# Patient Record
Sex: MALE | Race: WHITE | NOT HISPANIC OR LATINO
[De-identification: names, ages, dates, MRNs, and addresses within clinical notes are randomized per-mention and may not be internally consistent; named-entity substitution may affect disease eponyms.]

---

## 2018-09-27 PROBLEM — Z00.00 ENCOUNTER FOR PREVENTIVE HEALTH EXAMINATION: Status: ACTIVE | Noted: 2018-09-27

## 2018-11-13 ENCOUNTER — RECORD ABSTRACTING (OUTPATIENT)
Age: 48
End: 2018-11-13

## 2018-11-13 DIAGNOSIS — Z78.9 OTHER SPECIFIED HEALTH STATUS: ICD-10-CM

## 2018-11-13 DIAGNOSIS — Z86.39 PERSONAL HISTORY OF OTHER ENDOCRINE, NUTRITIONAL AND METABOLIC DISEASE: ICD-10-CM

## 2018-11-13 RX ORDER — FLASH GLUCOSE SENSOR
KIT MISCELLANEOUS
Refills: 0 | Status: ACTIVE | COMMUNITY

## 2018-11-14 ENCOUNTER — APPOINTMENT (OUTPATIENT)
Dept: ENDOCRINOLOGY | Facility: CLINIC | Age: 48
End: 2018-11-14
Payer: COMMERCIAL

## 2018-11-14 VITALS
WEIGHT: 195 LBS | HEART RATE: 75 BPM | DIASTOLIC BLOOD PRESSURE: 78 MMHG | BODY MASS INDEX: 26.41 KG/M2 | SYSTOLIC BLOOD PRESSURE: 118 MMHG | HEIGHT: 72 IN

## 2018-11-14 PROCEDURE — 99214 OFFICE O/P EST MOD 30 MIN: CPT | Mod: 25

## 2018-11-14 PROCEDURE — 82962 GLUCOSE BLOOD TEST: CPT

## 2018-11-14 PROCEDURE — 95251 CONT GLUC MNTR ANALYSIS I&R: CPT

## 2018-12-20 ENCOUNTER — APPOINTMENT (OUTPATIENT)
Dept: ENDOCRINOLOGY | Facility: CLINIC | Age: 48
End: 2018-12-20
Payer: COMMERCIAL

## 2018-12-20 PROCEDURE — 76536 US EXAM OF HEAD AND NECK: CPT

## 2019-02-26 LAB
GLUCOSE SERPL-MCNC: 147
HBA1C MFR BLD HPLC: 7.2
LDLC SERPL DIRECT ASSAY-MCNC: 118

## 2019-02-27 ENCOUNTER — APPOINTMENT (OUTPATIENT)
Dept: ENDOCRINOLOGY | Facility: CLINIC | Age: 49
End: 2019-02-27
Payer: COMMERCIAL

## 2019-02-27 VITALS
HEIGHT: 72 IN | HEART RATE: 87 BPM | SYSTOLIC BLOOD PRESSURE: 120 MMHG | WEIGHT: 200 LBS | DIASTOLIC BLOOD PRESSURE: 80 MMHG | BODY MASS INDEX: 27.09 KG/M2

## 2019-02-27 LAB — GLUCOSE BLDC GLUCOMTR-MCNC: 112

## 2019-02-27 PROCEDURE — 99215 OFFICE O/P EST HI 40 MIN: CPT | Mod: 25

## 2019-02-27 PROCEDURE — 82962 GLUCOSE BLOOD TEST: CPT

## 2019-02-27 PROCEDURE — 95251 CONT GLUC MNTR ANALYSIS I&R: CPT

## 2019-06-07 ENCOUNTER — MOBILE ON CALL (OUTPATIENT)
Age: 49
End: 2019-06-07

## 2019-06-07 ENCOUNTER — RX RENEWAL (OUTPATIENT)
Age: 49
End: 2019-06-07

## 2019-06-28 ENCOUNTER — RX RENEWAL (OUTPATIENT)
Age: 49
End: 2019-06-28

## 2019-07-03 ENCOUNTER — RX RENEWAL (OUTPATIENT)
Age: 49
End: 2019-07-03

## 2019-10-09 ENCOUNTER — TRANSCRIPTION ENCOUNTER (OUTPATIENT)
Age: 49
End: 2019-10-09

## 2019-10-09 ENCOUNTER — APPOINTMENT (OUTPATIENT)
Dept: ENDOCRINOLOGY | Facility: CLINIC | Age: 49
End: 2019-10-09
Payer: COMMERCIAL

## 2019-10-09 VITALS
HEIGHT: 72 IN | WEIGHT: 200 LBS | DIASTOLIC BLOOD PRESSURE: 80 MMHG | SYSTOLIC BLOOD PRESSURE: 120 MMHG | BODY MASS INDEX: 27.09 KG/M2 | HEART RATE: 91 BPM

## 2019-10-09 LAB
GLUCOSE BLDC GLUCOMTR-MCNC: 146
HBA1C MFR BLD HPLC: 7.2
LDLC SERPL DIRECT ASSAY-MCNC: 126

## 2019-10-09 PROCEDURE — 99214 OFFICE O/P EST MOD 30 MIN: CPT | Mod: 25

## 2019-10-09 PROCEDURE — 82962 GLUCOSE BLOOD TEST: CPT

## 2019-10-09 PROCEDURE — 95251 CONT GLUC MNTR ANALYSIS I&R: CPT

## 2019-10-09 NOTE — REVIEW OF SYSTEMS
[Nocturia] : nocturia [Polyuria] : polyuria [Fatigue] : no fatigue [Recent Weight Gain (___ Lbs)] : no recent weight gain [Recent Weight Loss (___ Lbs)] : no recent weight loss [Blurry Vision] : no blurred vision [Dysphagia] : no dysphagia [Dysphonia] : no dysphonia [Neck Pain] : no neck pain [Chest Pain] : no chest pain [Palpitations] : no palpitations [Shortness Of Breath] : no shortness of breath [SOB on Exertion] : no shortness of breath during exertion [Nausea] : no nausea [Vomiting] : no vomiting was observed [Abdominal Pain] : no abdominal pain [Pain/Numbness of Digits] : no pain/numbness of digits [Depression] : no depression [Anxiety] : no anxiety [Polydipsia] : no polydipsia

## 2019-10-09 NOTE — PHYSICAL EXAM
[Alert] : alert [No Acute Distress] : no acute distress [Well Developed] : well developed [Well Nourished] : well nourished [EOMI] : extra ocular movement intact [PERRL] : pupils equal, round and reactive to light [No Neck Mass] : no neck mass was observed [Thyroid Not Enlarged] : the thyroid was not enlarged [No Thyroid Nodules] : there were no palpable thyroid nodules [No Respiratory Distress] : no respiratory distress [Clear to Auscultation] : lungs were clear to auscultation bilaterally [Normal Rate] : heart rate was normal  [Normal S1, S2] : normal S1 and S2 [Normal Bowel Sounds] : normal bowel sounds [Soft] : abdomen soft [Not Tender] : non-tender [Not Distended] : not distended [No Rash] : no rash [Normal Gait] : normal gait [Normal Reflexes] : deep tendon reflexes were 2+ and symmetric [Cranial Nerves Intact] : cranial nerves 2-12 were intact [No Tremors] : no tremors [Normal Insight/Judgement] : insight and judgment were intact [Oriented x3] : oriented to person, place, and time [Normal Affect] : the affect was normal [Normal Mood] : the mood was normal [Foot Ulcers] : no foot ulcers [Right Foot Was Examined] : right foot ~C was examined [Left Foot Was Examined] : left foot ~C was examined [Normal] : normal [2+] : 2+ in the dorsalis pedis [Vibration Dec.] : normal vibratory sensation at the level of the toes [Position Sense Dec.] : normal position sense at the level of the toes [Diminished Throughout Both Feet] : normal tactile sensation with monofilament testing throughout both feet

## 2019-10-09 NOTE — DATA REVIEWED
[FreeTextEntry1] : Thyroid FNA 2006 Right nodule 1.2 cm - no malignant cells\par \par Thyroid sono 5/18/17 - Right thyriod nodule 1.1x1.0x1.0 cm\par \par Thyroid Ultrasound Report 12/20/18 \par Comparison: Report dated 5/18/17. \par Findings: \par Right thyroid lobe   4.0x1.7x1.7  cm\par Right lower pole isoechoic nodule 1.1x1.2x1.0 cm - not vascular\par heterogeneous gland\par Left Thyroid lobe  4.0x1.4x1.1   cm\par heterogeneous gland\par Isthmus .3  cm\par Impression\par stable Right thyroid nodule that was previously biopsied and reported as benign \par \par \par no recent labs\par \par

## 2019-10-09 NOTE — REASON FOR VISIT
[Follow-Up: _____] : a [unfilled] follow-up visit [FreeTextEntry1] : T1DM, hypothyroid, thyroid nodule, hyperlipidemia

## 2019-10-09 NOTE — ASSESSMENT
[FreeTextEntry1] : T1DM with erratic control. Yuan personal downloaded and reviewed - Interpretation: overnight hyperglycemia, occ post meal lows\par - cont current insulin regimen, pt to start keto diet which may reduce his insulin requirements, advised f/u CDE 1 month. call with highs/lows\par - f/u labs done this am\par - eye exam with ophthalmology\par \par 2. Hypothyroid - euthyroid on exam, cont current LT4 dose, f/u labs done this am\par \par 3. stable Right thyroid nodule that was previously biopsied in 2006 and reported as benign- RTO thyroid sono 12/2019\par \par 4. hyperlipidemia - adhere with statin

## 2019-10-18 ENCOUNTER — RX RENEWAL (OUTPATIENT)
Age: 49
End: 2019-10-18

## 2019-11-21 ENCOUNTER — APPOINTMENT (OUTPATIENT)
Dept: ENDOCRINOLOGY | Facility: CLINIC | Age: 49
End: 2019-11-21

## 2019-11-25 ENCOUNTER — RX RENEWAL (OUTPATIENT)
Age: 49
End: 2019-11-25

## 2019-12-19 ENCOUNTER — APPOINTMENT (OUTPATIENT)
Dept: ENDOCRINOLOGY | Facility: CLINIC | Age: 49
End: 2019-12-19
Payer: COMMERCIAL

## 2019-12-19 PROCEDURE — 76536 US EXAM OF HEAD AND NECK: CPT

## 2020-01-11 ENCOUNTER — RX RENEWAL (OUTPATIENT)
Age: 50
End: 2020-01-11

## 2020-01-11 ENCOUNTER — APPOINTMENT (OUTPATIENT)
Dept: ENDOCRINOLOGY | Facility: CLINIC | Age: 50
End: 2020-01-11

## 2020-01-18 ENCOUNTER — APPOINTMENT (OUTPATIENT)
Dept: ENDOCRINOLOGY | Facility: CLINIC | Age: 50
End: 2020-01-18

## 2020-04-03 ENCOUNTER — RX RENEWAL (OUTPATIENT)
Age: 50
End: 2020-04-03

## 2020-04-19 ENCOUNTER — RX RENEWAL (OUTPATIENT)
Age: 50
End: 2020-04-19

## 2020-04-21 ENCOUNTER — RX RENEWAL (OUTPATIENT)
Age: 50
End: 2020-04-21

## 2020-06-27 ENCOUNTER — APPOINTMENT (OUTPATIENT)
Dept: ENDOCRINOLOGY | Facility: CLINIC | Age: 50
End: 2020-06-27
Payer: COMMERCIAL

## 2020-06-27 PROCEDURE — 99214 OFFICE O/P EST MOD 30 MIN: CPT | Mod: 95

## 2020-06-27 NOTE — HISTORY OF PRESENT ILLNESS
[FreeTextEntry1] : Interval Hx: \par Quality: Type 1\par Severity: poor control, severe\par Duration: 2006\par Onset: blood test \par \par ASSOCIATED SYMPTOMS/COMPLICATIONS: 2019 per pt no DR. edward neuropathy. eye exam, 2020 no \par \par MODIFYING FACTORS:\par Lifetsyle: Diet: eats 3 meals daily, BF high is carb, trying to reduce snacking Exercise: moderately 3-4 days/week- lift weights and walking\par Current DM meds: \par Lantus 45 units in am\par Novolog Carb ratio 1:15, SF: 20, target 100. Avg 8 units w meals\par \par SMBG - Yuan personal. 14 day avg 160. Some lows on weekends with golfing\par ---------------------------------------------------------------------------------------\par Thyroid nodule since 2006, benign Right nodule FNA in 2006. Denies anterior neck pain, dysphagia or voice change\par -------------------------------------------------------------------------------------------------\par Hypothyroid since 2006\par adherent with LT4 100 mcg daily with Vit D\par -----------------------------------------------------------------------------------------------\par HLD - ran out, Rosuvastatin 5 mg every other day\par \par \par \par \par \par

## 2020-06-27 NOTE — PHYSICAL EXAM
[Well Nourished] : well nourished [Healthy Appearance] : healthy appearance [Alert] : alert [No Respiratory Distress] : no respiratory distress [Foot Ulcers] : no foot ulcers [No Accessory Muscle Use] : no accessory muscle use [Normal Affect] : the affect was normal [Normal Insight/Judgement] : insight and judgment were intact [Oriented x3] : oriented to person, place, and time [Normal Mood] : the mood was normal

## 2020-06-27 NOTE — DATA REVIEWED
[FreeTextEntry1] : Thyroid FNA 2006 Right nodule 1.2 cm - no malignant cells\par \par Thyroid sono 5/18/17 - Right thyriod nodule 1.1x1.0x1.0 cm\par \par Thyroid Ultrasound Report 12/20/18 \par Comparison: Report dated 5/18/17. \par Findings: \par Right thyroid lobe   4.0x1.7x1.7  cm\par Right lower pole isoechoic nodule 1.1x1.2x1.0 cm - not vascular\par heterogeneous gland\par Left Thyroid lobe  4.0x1.4x1.1   cm\par heterogeneous gland\par Isthmus .3  cm\par Impression\par stable Right thyroid nodule that was previously biopsied and reported as benign \par \par \par Thyroid Ultrasound Report 12/19/19 \par Comparison: Report dated 12/20/18. \par Findings: \par Right thyroid lobe   4.4x1.5x1.4  cm\par Right lower pole nodule [id number] mm - isoechoic, previously biopsied in 2006 and reported as benign\par Left Thyroid lobe   4.0x1.3x1.5  cm\par Isthmus .3  cm\par Impression\par stable Right thyroid nodule that was previously biopsied and reported as benign\par normal thyroid size\par repeat sono 1 year \par \par \par \par Labs 6/25/20\par Gluc 141, A1c 7.6%\par Cr 0.91, GFR 98\par , HDL 56, Tg 92\par direct LDl 119\par urine alb.Cr 0\par TSH 4.710, T4 1.24\par \par no recent labs\par \par

## 2020-06-27 NOTE — ASSESSMENT
[FreeTextEntry1] : T1DM wo complications - A1c suboptimal\par - invitation to [facility] sent to pt\par - cont current insulin regimen, will need to see Yuan report to make insulin adjustment\par - repeat A1c 3 months\par \par 2. Hypothyroid - euthyroid on exam,TSH mildly elevated likely due to timing of Lt4 with vitamin. suggest taking Lt4 separate from Vit D,  repeat levels 3 months\par \par 3. stable Right thyroid nodule that was previously biopsied in 2006 and reported as benign- RTO thyroid sono 12/2020\par \par 4. hyperlipidemia - LDL chol elevated, restart statin

## 2020-06-27 NOTE — REASON FOR VISIT
[Follow - Up] : a follow-up visit [DM Type 1] : DM Type 1 [Thyroid nodule/ MNG] : thyroid nodule/ MNG [Other___] : [unfilled] [Hypothyroidism] : hypothyroidism [Other Location: e.g. Home (Enter Location, City,State)___] : at [unfilled] [Verbal consent obtained from patient] : the patient, [unfilled] [Home] : at home, [unfilled] , at the time of the visit.

## 2020-06-27 NOTE — REVIEW OF SYSTEMS
[Recent Weight Gain (___ Lbs)] : no recent weight gain [Blurred Vision] : no blurred vision [Recent Weight Loss (___ Lbs)] : no recent weight loss [As Noted in HPI] : as noted in HPI [Shortness Of Breath] : no shortness of breath [Chest Pain] : no chest pain [Abdominal Pain] : no abdominal pain [SOB on Exertion] : no shortness of breath on exertion [Nausea] : no nausea [Nocturia] : no nocturia [Polyuria] : no polyuria [Joint Pain] : no joint pain [Pain/Numbness of Digits] : no pain/numbness of digits [Tremors] : no tremors [Depression] : no depression [Myalgia] : no myalgia  [FreeTextEntry9] : foot cramps at night [Anxiety] : no anxiety [Polydipsia] : no polydipsia

## 2020-10-04 ENCOUNTER — RX RENEWAL (OUTPATIENT)
Age: 50
End: 2020-10-04

## 2020-10-17 ENCOUNTER — APPOINTMENT (OUTPATIENT)
Dept: ENDOCRINOLOGY | Facility: CLINIC | Age: 50
End: 2020-10-17
Payer: COMMERCIAL

## 2020-10-17 PROCEDURE — 99214 OFFICE O/P EST MOD 30 MIN: CPT | Mod: 95

## 2020-10-17 NOTE — DATA REVIEWED
[FreeTextEntry1] : Thyroid FNA 2006 Right nodule 1.2 cm - no malignant cells\par \par Thyroid sono 5/18/17 - Right thyriod nodule 1.1x1.0x1.0 cm\par \par Thyroid Ultrasound Report 12/20/18 \par Comparison: Report dated 5/18/17. \par Findings: \par Right thyroid lobe   4.0x1.7x1.7  cm\par Right lower pole isoechoic nodule 1.1x1.2x1.0 cm - not vascular\par heterogeneous gland\par Left Thyroid lobe  4.0x1.4x1.1   cm\par heterogeneous gland\par Isthmus .3  cm\par Impression\par stable Right thyroid nodule that was previously biopsied and reported as benign \par \par \par Thyroid Ultrasound Report 12/19/19 \par Comparison: Report dated 12/20/18. \par Findings: \par Right thyroid lobe   4.4x1.5x1.4  cm\par Right lower pole nodule [id number] mm - isoechoic, previously biopsied in 2006 and reported as benign\par Left Thyroid lobe   4.0x1.3x1.5  cm\par Isthmus .3  cm\par Impression\par stable Right thyroid nodule that was previously biopsied and reported as benign\par normal thyroid size\par repeat sono 1 year \par \par \par \par Labs 6/25/20\par Gluc 141, A1c 7.6%\par Cr 0.91, GFR 98\par , HDL 56, Tg 92\par direct LDl 119\par urine alb.Cr 0\par TSH 4.710, T4 1.24\par \par \par

## 2020-10-17 NOTE — PHYSICAL EXAM
[Alert] : alert [Well Nourished] : well nourished [No Respiratory Distress] : no respiratory distress [No Accessory Muscle Use] : no accessory muscle use [Healthy Appearance] : healthy appearance [Normal Affect] : the affect was normal [Normal Insight/Judgement] : insight and judgment were intact [Oriented x3] : oriented to person, place, and time [Normal Mood] : the mood was normal [Foot Ulcers] : no foot ulcers

## 2020-10-17 NOTE — ASSESSMENT
[FreeTextEntry1] : 1. T1DM wo complications - poor control on review of lods\par - cont lantus 45 units at bedtime\par - suggest Novolog 1:13, take insulin during meals/right after meals\par - restart Yuan\par -RTO 4 weeks w NP\par - updated labs needed\par \par \par 2. Hypothyroid - euthyroid on exam\par - updated labs needed\par - cont LT4 100 mcg daily\par \par 3. stable Right thyroid nodule that was previously biopsied in 2006 and reported as benign\par - RTO thyroid sono 12/2020\par \par 4. hyperlipidemia\par  - cont statin\par - updated labs needed

## 2020-10-17 NOTE — HISTORY OF PRESENT ILLNESS
[FreeTextEntry1] : Interval Hx: not using Yuan b/c keeps falling\par \par Quality: Type 1\par Severity: poor control, severe\par Duration: 2006\par Onset: blood test \par \par ASSOCIATED SYMPTOMS/COMPLICATIONS: \par 2019 per pt no . \par denies neuropathy.\par  eye exam, 2020 no \par \par MODIFYING FACTORS:\par Lifetsyle: Diet: eats 3 meals daily, BF high is carb, trying to reduce snacking Exercise: moderately 3-4 days/week- lift weights and walking. Weight stable\par Current DM meds: \par Lantus 45 units in am\par Novolog Carb ratio 1:15, SF: 1:20, target 100. Avg 4-5 units w meals, about 45 grams carbs per meals. Taking it after meals\par \par SMBG - not using Yuan, testing FS 4-5x daily.  \par 10/10 - 168  153  223\par 10/11 156 - - 214 - 215\par 10/12 229 - 130 ---------\par 10/13 128 - 189 - 316\par 10/14 291 - 120 - 218 \par 10/15 ----- 142 - 58 \par 10/16 89 - 266 - 247\par 10/17 147\par  \par ---------------------------------------------------------------------------------------\par Thyroid nodule since 2006, benign Right nodule FNA in 2006. \par Denies anterior neck pain, dysphagia or voice change\par -------------------------------------------------------------------------------------------------\par Hypothyroid since 2006\par adherent with LT4 100 mcg daily with Vit D\par -----------------------------------------------------------------------------------------------\par HLD -  adherent Rosuvastatin 5 mg every other day\par \par \par \par \par \par

## 2020-10-17 NOTE — REASON FOR VISIT
[Follow - Up] : a follow-up visit [DM Type 1] : DM Type 1 [Hypothyroidism] : hypothyroidism [Other___] : [unfilled] [Thyroid nodule/ MNG] : thyroid nodule/ MNG [Home] : at home, [unfilled] , at the time of the visit. [Other Location: e.g. Home (Enter Location, City,State)___] : at [unfilled] [Verbal consent obtained from patient] : the patient, [unfilled]

## 2020-10-17 NOTE — REVIEW OF SYSTEMS
[As Noted in HPI] : as noted in HPI [Recent Weight Gain (___ Lbs)] : no recent weight gain [Recent Weight Loss (___ Lbs)] : no recent weight loss [Blurred Vision] : no blurred vision [Chest Pain] : no chest pain [Shortness Of Breath] : no shortness of breath [Nausea] : no nausea [SOB on Exertion] : no shortness of breath on exertion [Abdominal Pain] : no abdominal pain [Polyuria] : no polyuria [Nocturia] : no nocturia [Tremors] : no tremors [Joint Pain] : no joint pain [Myalgia] : no myalgia  [Anxiety] : no anxiety [Depression] : no depression [Pain/Numbness of Digits] : no pain/numbness of digits [Polydipsia] : no polydipsia

## 2020-10-27 ENCOUNTER — NON-APPOINTMENT (OUTPATIENT)
Age: 50
End: 2020-10-27

## 2021-01-04 ENCOUNTER — RX RENEWAL (OUTPATIENT)
Age: 51
End: 2021-01-04

## 2021-03-03 ENCOUNTER — NON-APPOINTMENT (OUTPATIENT)
Age: 51
End: 2021-03-03

## 2021-03-13 ENCOUNTER — APPOINTMENT (OUTPATIENT)
Dept: ENDOCRINOLOGY | Facility: CLINIC | Age: 51
End: 2021-03-13

## 2021-07-06 ENCOUNTER — APPOINTMENT (OUTPATIENT)
Dept: ENDOCRINOLOGY | Facility: CLINIC | Age: 51
End: 2021-07-06
Payer: COMMERCIAL

## 2021-07-06 VITALS
WEIGHT: 200 LBS | OXYGEN SATURATION: 99 % | HEART RATE: 80 BPM | HEIGHT: 72 IN | DIASTOLIC BLOOD PRESSURE: 60 MMHG | SYSTOLIC BLOOD PRESSURE: 132 MMHG | BODY MASS INDEX: 27.09 KG/M2

## 2021-07-06 LAB — GLUCOSE BLDC GLUCOMTR-MCNC: 286

## 2021-07-06 PROCEDURE — 99214 OFFICE O/P EST MOD 30 MIN: CPT | Mod: 25

## 2021-07-06 PROCEDURE — 82962 GLUCOSE BLOOD TEST: CPT

## 2021-07-06 PROCEDURE — 99072 ADDL SUPL MATRL&STAF TM PHE: CPT

## 2021-07-06 PROCEDURE — 95251 CONT GLUC MNTR ANALYSIS I&R: CPT

## 2021-07-06 NOTE — DATA REVIEWED
[FreeTextEntry1] : Thyroid FNA 2006 Right nodule 1.2 cm - no malignant cells\par \par Thyroid sono 5/18/17 - Right thyriod nodule 1.1x1.0x1.0 cm\par \par Thyroid Ultrasound Report 12/20/18 \par Comparison: Report dated 5/18/17. \par Findings: \par Right thyroid lobe   4.0x1.7x1.7  cm\par Right lower pole isoechoic nodule 1.1x1.2x1.0 cm - not vascular\par heterogeneous gland\par Left Thyroid lobe  4.0x1.4x1.1   cm\par heterogeneous gland\par Isthmus .3  cm\par Impression\par stable Right thyroid nodule that was previously biopsied and reported as benign \par \par \par Thyroid Ultrasound Report 12/19/19 \par Comparison: Report dated 12/20/18. \par Findings: \par Right thyroid lobe   4.4x1.5x1.4  cm\par Right lower pole nodule [id number] mm - isoechoic, previously biopsied in 2006 and reported as benign\par Left Thyroid lobe   4.0x1.3x1.5  cm\par Isthmus .3  cm\par Impression\par stable Right thyroid nodule that was previously biopsied and reported as benign\par normal thyroid size\par repeat sono 1 year \par ================================================\par \par \par Labs 6/25/20\par Gluc 141, A1c 7.6%\par Cr 0.91, GFR 98\par , HDL 56, Tg 92\par direct LDl 119\par urine alb.Cr 0\par TSH 4.710, T4 1.24\par \par \par "Labs 10/23/20 reviewed\par 1.  - too high, watch diet, increase rosuvastatin to 5 mg daily\par 2. A1c 8.1 - too high, send logs\par 3. TSH 4.630 elevated - has he missed any Lt4 doses? is he taking it correctly? if not then increase to LT4 112 mcg daily and repeat TFTs 8 weeks\par 4. lab did wrong urine test, should be urine microalb/Cr - this can be done in 8 weeks with thyroid levels"\par \par \par

## 2021-07-06 NOTE — PHYSICAL EXAM
[Alert] : alert [Well Nourished] : well nourished [Healthy Appearance] : healthy appearance [No Accessory Muscle Use] : no accessory muscle use [Oriented x3] : oriented to person, place, and time [Normal Affect] : the affect was normal [Normal Insight/Judgement] : insight and judgment were intact [Normal Mood] : the mood was normal [EOMI] : extra ocular movement intact [No LAD] : no lymphadenopathy [No Thyroid Nodules] : no palpable thyroid nodules [Clear to Auscultation] : lungs were clear to auscultation bilaterally [Not Tender] : non-tender [Soft] : abdomen soft [Foot Ulcers] : no foot ulcers [2+] : 2+ in the dorsalis pedis [Vibration Dec.] : normal vibratory sensation at the level of the toes [Position Sense Dec.] : normal position sense at the level of the toes [Diminished Throughout Both Feet] : normal tactile sensation with monofilament testing throughout both feet

## 2021-07-06 NOTE — HISTORY OF PRESENT ILLNESS
[FreeTextEntry1] : Interval Hx:  no issues, no changes. did not have labs done due to death in family\par \par Quality: Type 1\par Severity: poor control, severe\par Duration: 2006\par Onset: blood test \par \par ASSOCIATED SYMPTOMS/COMPLICATIONS: \par no recent eye exam \par denies neuropathy.\par \par MODIFYING FACTORS:\par Lifetsyle: Diet: eats 3 meals daily, BF high is carb, trying to reduce snacking Exercise: moderately 3-4 days/week- lift weights and walking. \par Current DM meds: \par Lantus 45 units in am\par Novolog Carb ratio 1:15, SF: 1:20, target 100. On avg taking 12 units\par \par SMBG - Yuan\par CGM downloaded and reviewed: Yuan\par Average glucose: 175\par % time CGM active: 78\par Glucose variability (target <36%): 38\par \par % VERY HIGH (>250): 13\par % HIGH (181-250): 36\par % TARGET ():46\par % LOW (54-69): 2\par % VERY LOW (<54): 3\par \par Interpretation: poor control, overnight lows, daytime hyperglycemia\par  ---------------------------------------------------------------------------------------\par Thyroid nodule since 2006, benign Right nodule FNA in 2006. \par Denies anterior neck pain, dysphagia or voice change\par -------------------------------------------------------------------------------------------------\par Hypothyroid since 2006\par adherent with LT4 112 mcg daily on empty stomach\par -----------------------------------------------------------------------------------------------\par HLD -  non-adherent Rosuvastatin 5 mg every other day\par \par \par \par \par \par

## 2021-07-06 NOTE — REVIEW OF SYSTEMS
[As Noted in HPI] : as noted in HPI [Recent Weight Gain (___ Lbs)] : no recent weight gain [Recent Weight Loss (___ Lbs)] : no recent weight loss [Blurred Vision] : no blurred vision [Chest Pain] : no chest pain [Shortness Of Breath] : no shortness of breath [SOB on Exertion] : no shortness of breath on exertion [Nausea] : no nausea [Abdominal Pain] : no abdominal pain [Polyuria] : no polyuria [Nocturia] : no nocturia [Joint Pain] : no joint pain [Myalgia] : no myalgia  [Tremors] : no tremors [Pain/Numbness of Digits] : no pain/numbness of digits [Depression] : no depression [Anxiety] : no anxiety [Polydipsia] : no polydipsia

## 2021-07-06 NOTE — ASSESSMENT
[FreeTextEntry1] : 1. T1DM wo complications - yuan reviewed - overnight lows, daytime hyperglycemia\par - decrease lantus 40 units, Novolog 1:12 w meals, cont ISF 1:20 target 100\par - cont Yuan\par -RTO 4 weeks w NP for another DL\par - updated labs needed this montha nd in 3 months\par - need eye exam w ophthalmology\par \par 2. Hypothyroid - euthyroid on exam\par - updated labs needed\par - cont LT4 112 mcg daily\par \par 3. stable Right thyroid nodule that was previously biopsied in 2006 and reported as benign, no recent sono\par - RTO thyroid sono \par \par 4. hyperlipidemia - nonadherent with statin\par  - adhere with  statin\par - updated labs needed

## 2021-09-22 LAB
HBA1C MFR BLD HPLC: 8.3
LDLC SERPL DIRECT ASSAY-MCNC: 125

## 2021-09-23 ENCOUNTER — APPOINTMENT (OUTPATIENT)
Dept: ENDOCRINOLOGY | Facility: CLINIC | Age: 51
End: 2021-09-23

## 2021-10-07 ENCOUNTER — APPOINTMENT (OUTPATIENT)
Dept: ENDOCRINOLOGY | Facility: CLINIC | Age: 51
End: 2021-10-07
Payer: COMMERCIAL

## 2021-10-07 PROCEDURE — 76536 US EXAM OF HEAD AND NECK: CPT

## 2021-10-19 ENCOUNTER — TRANSCRIPTION ENCOUNTER (OUTPATIENT)
Age: 51
End: 2021-10-19

## 2021-10-21 ENCOUNTER — RESULT CHARGE (OUTPATIENT)
Age: 51
End: 2021-10-21

## 2021-10-21 ENCOUNTER — APPOINTMENT (OUTPATIENT)
Dept: ENDOCRINOLOGY | Facility: CLINIC | Age: 51
End: 2021-10-21
Payer: COMMERCIAL

## 2021-10-21 VITALS
SYSTOLIC BLOOD PRESSURE: 114 MMHG | BODY MASS INDEX: 26.95 KG/M2 | OXYGEN SATURATION: 97 % | DIASTOLIC BLOOD PRESSURE: 74 MMHG | TEMPERATURE: 98.6 F | HEART RATE: 92 BPM | WEIGHT: 199 LBS | HEIGHT: 72 IN

## 2021-10-21 LAB — GLUCOSE BLDC GLUCOMTR-MCNC: 188

## 2021-10-21 PROCEDURE — 99215 OFFICE O/P EST HI 40 MIN: CPT | Mod: 25

## 2021-10-21 PROCEDURE — 95251 CONT GLUC MNTR ANALYSIS I&R: CPT

## 2021-10-21 PROCEDURE — 82962 GLUCOSE BLOOD TEST: CPT

## 2021-10-21 NOTE — ASSESSMENT
[FreeTextEntry1] : 1. T1DM wo complications, A1c worse, Yuan reviewed - erratic control with highs/lows during daytime, overnight hyperglycemia. due to behavior and meals\par - cont Yuan\par - take insulin before meals, not after\par - cont Lantus 40 units daily\par - change Novolog: ICR 1:10, ISF 1:30, target 100\par - stop late night snacking which is causing overnight highs, can have snak if bedtime FS <100\par - reviewed areas for injection avoid areas of lipohypertrophy which can cause variable insulin absorption\par - suggested using apps to help estimate carbs better\par \par 2. Hypothyroid - TSH elevated, increase LT4 125 mcg dailyu, repeat TFTs 6 weeks\par \par 3. stable Right thyroid nodule that was previously biopsied in 2006 and reported as benign, \par - repeat sonogram 2022 to monitor size and appearance of nodule\par \par 4. hyperlipidemia - LDL chol elevated, adhere with statin

## 2021-10-21 NOTE — PHYSICAL EXAM
[Alert] : alert [Well Nourished] : well nourished [Healthy Appearance] : healthy appearance [EOMI] : extra ocular movement intact [No LAD] : no lymphadenopathy [No Thyroid Nodules] : no palpable thyroid nodules [No Accessory Muscle Use] : no accessory muscle use [Clear to Auscultation] : lungs were clear to auscultation bilaterally [Not Tender] : non-tender [Soft] : abdomen soft [Oriented x3] : oriented to person, place, and time [Normal Affect] : the affect was normal [Normal Insight/Judgement] : insight and judgment were intact [Normal Mood] : the mood was normal [Foot Ulcers] : no foot ulcers [de-identified] : (+) lipohypertrophy on abd wall

## 2021-10-21 NOTE — HISTORY OF PRESENT ILLNESS
[FreeTextEntry1] : Interval Hx:  \par finding it hard to inject insulin, noticing sites are hard\par \par Quality: Type 1\par Severity: poor control, severe\par Duration: \par Onset: blood test \par \par ASSOCIATED SYMPTOMS/COMPLICATIONS: \par 10/12/21 eye exam no DR\par denies neuropathy.\par 10/2021 neg alb/Cr, GFR 89\par \par MODIFYING FACTORS:\par Lifetsyle: Diet: eats 3 meals daily, BF high is carb, trying to reduce snacking Exercise: moderately 3-4 days/week- lift weights and walking. \par Current DM meds: \par Lantus 40 units in am\par Novolo:12 w meals, cont ISF 1:20; target 100\par \par SMBG - Yuan\par CGM downloaded and reviewed: Yuan\par Average glucose: 199\par % time CGM active: 72\par Glucose variability (target <36%): 32\par \par % VERY HIGH (>250): 24\par % HIGH (181-250): 35\par % TARGET ():29\par % LOW (54-69): 2\par % VERY LOW (<54): 0\par \par Interpretation: improved lows, now worsening control, erratic numbers\par  ---------------------------------------------------------------------------------------\par Thyroid nodule since , benign Right nodule FNA in . \par Denies anterior neck pain, dysphagia or voice change\par -------------------------------------------------------------------------------------------------\par Hypothyroid since \par adherent with LT4 112 mcg daily on empty stomach\par -----------------------------------------------------------------------------------------------\par HLD -  non-adherent Rosuvastatin 5 mg daily\par \par \par \par \par \par

## 2021-10-21 NOTE — DATA REVIEWED
[FreeTextEntry1] : Thyroid FNA 2006 Right nodule 1.2 cm - no malignant cells\par \par Thyroid sono 5/18/17 - Right thyriod nodule 1.1x1.0x1.0 cm\par \par Thyroid Ultrasound Report 12/20/18 \par Comparison: Report dated 5/18/17. \par Findings: \par Right thyroid lobe   4.0x1.7x1.7  cm\par Right lower pole isoechoic nodule 1.1x1.2x1.0 cm - not vascular\par heterogeneous gland\par Left Thyroid lobe  4.0x1.4x1.1   cm\par heterogeneous gland\par Isthmus .3  cm\par Impression\par stable Right thyroid nodule that was previously biopsied and reported as benign \par \par \par Thyroid Ultrasound Report 12/19/19 \par Comparison: Report dated 12/20/18. \par Findings: \par Right thyroid lobe   4.4x1.5x1.4  cm\par Right lower pole nodule [id number] mm - isoechoic, previously biopsied in 2006 and reported as benign\par Left Thyroid lobe   4.0x1.3x1.5  cm\par Isthmus .3  cm\par Impression\par stable Right thyroid nodule that was previously biopsied and reported as benign\par normal thyroid size\par repeat sono 1 year \par \par Thyroid Ultrasound Report 10/7/21 \par Comparison: Report dated 12/19/19. \par Findings: \par Right thyroid lobe   4.2x1.7x1.5  cm - heterogeneous gland\par Right lower pole nodule 1.1x1.1x1.0 cm - stable, hypoechoic, not vascular, no microcalcification\par Left Thyroid lobe   3.7x1.3x1.2  cm -heterogeneous gland\par Isthmus 0.3  cm\par Impression\par normal thyroid size\par stable Right lower pole thyroid nodule\par repeat sonogram in 1 year \par ================================================\par \par \par Labs 6/25/20\par Gluc 141, A1c 7.6%\par Cr 0.91, GFR 98\par , HDL 56, Tg 92\par direct LDl 119\par urine alb.Cr 0\par TSH 4.710, T4 1.24\par \par \par "Labs 10/23/20 reviewed\par 1.  - too high, watch diet, increase rosuvastatin to 5 mg daily\par 2. A1c 8.1 - too high, send logs\par 3. TSH 4.630 elevated - has he missed any Lt4 doses? is he taking it correctly? if not then increase to LT4 112 mcg daily and repeat TFTs 8 weeks\par 4. lab did wrong urine test, should be urine microalb/Cr - this can be done in 8 weeks with thyroid levels"\par \par Labs 9/20/21\par CBC wnl\par Gluc 120, A1c 8.3\par Cr 0.98, GFR 89\par Tg 100, HDL 64, \par urine alb/Cr 4\par TSH 5.840, Ft4 1.13\par \par \par

## 2021-12-10 ENCOUNTER — APPOINTMENT (OUTPATIENT)
Dept: ENDOCRINOLOGY | Facility: CLINIC | Age: 51
End: 2021-12-10
Payer: COMMERCIAL

## 2021-12-10 ENCOUNTER — RESULT CHARGE (OUTPATIENT)
Age: 51
End: 2021-12-10

## 2021-12-10 VITALS
HEART RATE: 66 BPM | HEIGHT: 72 IN | BODY MASS INDEX: 26.41 KG/M2 | DIASTOLIC BLOOD PRESSURE: 70 MMHG | SYSTOLIC BLOOD PRESSURE: 120 MMHG | WEIGHT: 195 LBS

## 2021-12-10 LAB — GLUCOSE BLDC GLUCOMTR-MCNC: 101

## 2021-12-10 PROCEDURE — 99214 OFFICE O/P EST MOD 30 MIN: CPT | Mod: 25

## 2021-12-10 PROCEDURE — 82962 GLUCOSE BLOOD TEST: CPT

## 2021-12-10 NOTE — PHYSICAL EXAM
[Alert] : alert [Well Nourished] : well nourished [Healthy Appearance] : healthy appearance [EOMI] : extra ocular movement intact [No LAD] : no lymphadenopathy [No Thyroid Nodules] : no palpable thyroid nodules [No Accessory Muscle Use] : no accessory muscle use [Clear to Auscultation] : lungs were clear to auscultation bilaterally [Not Tender] : non-tender [Soft] : abdomen soft [Oriented x3] : oriented to person, place, and time [Normal Affect] : the affect was normal [Normal Insight/Judgement] : insight and judgment were intact [Normal Mood] : the mood was normal [Foot Ulcers] : no foot ulcers

## 2021-12-10 NOTE — HISTORY OF PRESENT ILLNESS
[FreeTextEntry1] : Interval Hx:  has not been using Yuan CGM, started using it again this week but unclear if he has been scanning, had been using glucometer\par finding it hard to inject insulin, noticing sites are hard\par \par Quality: Type 1\par Severity: poor control, severe\par Duration: 2006\par Onset: blood test \par \par ASSOCIATED SYMPTOMS/COMPLICATIONS: \par 10/12/21 eye exam no DR\par denies neuropathy.\par 10/2021 neg alb/Cr, GFR 89\par \par MODIFYING FACTORS:\par Lifetsyle: Diet: eats 3 meals daily, BF high is carb, trying to reduce snacking at bedtime. Exercise: moderately 3-4 days/week- lift weights and walking. \par Current DM meds: \par Lantus 40 units in am\par Novolog: ICR 1:10, ISF 1:30, target 100\par \par SMBG - Yuan\par CGM downloaded and reviewed: Yuan\par not enough data, <72 hours\par reports waking up FS 's, reports improved control\par \par \par  ---------------------------------------------------------------------------------------\par Thyroid nodule since 2006, benign Right nodule FNA in 2006. \par Denies anterior neck pain, dysphagia or voice change\par -------------------------------------------------------------------------------------------------\par Hypothyroid since 2006\par adherent with LT4 125 mcg daily on empty stomach\par -----------------------------------------------------------------------------------------------\par HLD -  adherent Rosuvastatin 5 mg daily\par \par \par \par \par \par

## 2021-12-10 NOTE — ASSESSMENT
[FreeTextEntry1] : 1. T1DM wo complications - not enough data on Yuan. Pt reports improved control.\par - cont Yuan CGM, needs to scan more or use glucometer when Yuan sensor not in place\par - take insulin before meals, not after\par - cont Lantus 40 units daily\par - cont Novolog: ICR 1:10, ISF 1:30, target 100\par - cont dietary changes\par - reviewed areas for injection avoid areas of lipohypertrophy which can cause variable insulin absorption\par - suggested using apps to help estimate carbs better\par - repeat A1c\par \par 2. Hypothyroid - TSH improved,  cont  LT4 125 mcg dailyu, repeat TFTs 8 weeks\par \par 3. stable Right thyroid nodule that was previously biopsied in 2006 and reported as benign, \par - repeat sonogram 2022 to monitor size and appearance of nodule\par \par 4. hyperlipidemia - LDL chol elevated, adhere with statin, monitor levels

## 2021-12-10 NOTE — DATA REVIEWED
[FreeTextEntry1] : Thyroid FNA 2006 Right nodule 1.2 cm - no malignant cells\par \par Thyroid sono 5/18/17 - Right thyriod nodule 1.1x1.0x1.0 cm\par \par Thyroid Ultrasound Report 12/20/18 \par Comparison: Report dated 5/18/17. \par Findings: \par Right thyroid lobe   4.0x1.7x1.7  cm\par Right lower pole isoechoic nodule 1.1x1.2x1.0 cm - not vascular\par heterogeneous gland\par Left Thyroid lobe  4.0x1.4x1.1   cm\par heterogeneous gland\par Isthmus .3  cm\par Impression\par stable Right thyroid nodule that was previously biopsied and reported as benign \par \par \par Thyroid Ultrasound Report 12/19/19 \par Comparison: Report dated 12/20/18. \par Findings: \par Right thyroid lobe   4.4x1.5x1.4  cm\par Right lower pole nodule [id number] mm - isoechoic, previously biopsied in 2006 and reported as benign\par Left Thyroid lobe   4.0x1.3x1.5  cm\par Isthmus .3  cm\par Impression\par stable Right thyroid nodule that was previously biopsied and reported as benign\par normal thyroid size\par repeat sono 1 year \par \par Thyroid Ultrasound Report 10/7/21 \par Comparison: Report dated 12/19/19. \par Findings: \par Right thyroid lobe   4.2x1.7x1.5  cm - heterogeneous gland\par Right lower pole nodule 1.1x1.1x1.0 cm - stable, hypoechoic, not vascular, no microcalcification\par Left Thyroid lobe   3.7x1.3x1.2  cm -heterogeneous gland\par Isthmus 0.3  cm\par Impression\par normal thyroid size\par stable Right lower pole thyroid nodule\par repeat sonogram in 1 year \par ================================================\par \par \par Labs 6/25/20\par Gluc 141, A1c 7.6%\par Cr 0.91, GFR 98\par , HDL 56, Tg 92\par direct LDl 119\par urine alb.Cr 0\par TSH 4.710, T4 1.24\par \par \par "Labs 10/23/20 reviewed\par 1.  - too high, watch diet, increase rosuvastatin to 5 mg daily\par 2. A1c 8.1 - too high, send logs\par 3. TSH 4.630 elevated - has he missed any Lt4 doses? is he taking it correctly? if not then increase to LT4 112 mcg daily and repeat TFTs 8 weeks\par 4. lab did wrong urine test, should be urine microalb/Cr - this can be done in 8 weeks with thyroid levels"\par \par Labs 9/20/21\par CBC wnl\par Gluc 120, A1c 8.3\par Cr 0.98, GFR 89\par Tg 100, HDL 64, \par urine alb/Cr 4\par TSH 5.840, Ft4 1.13\par \par Labs 12/6/21 TSH 3.850, Ft4 1.27\par \par  upon my review of CXR, there is no appreciable consolidation- would favor observing off abx; check procal level  Dr. Moore (pulm) consulted  h/o DANIEL- will likely need CPAP QHS upon my review of CXR, there is no appreciable consolidation- would favor observing off abx; check procal level  Dr. Moore (pulm) consulted  h/o DANIEL- will likely need CPAP QHS  Received a CTA chest ordered by Pulm that did not show PNA, PE or pleural effusion.

## 2022-01-12 ENCOUNTER — RX RENEWAL (OUTPATIENT)
Age: 52
End: 2022-01-12

## 2022-03-18 ENCOUNTER — RESULT CHARGE (OUTPATIENT)
Age: 52
End: 2022-03-18

## 2022-03-18 ENCOUNTER — APPOINTMENT (OUTPATIENT)
Dept: ENDOCRINOLOGY | Facility: CLINIC | Age: 52
End: 2022-03-18
Payer: COMMERCIAL

## 2022-03-18 VITALS
WEIGHT: 200 LBS | HEIGHT: 72 IN | SYSTOLIC BLOOD PRESSURE: 110 MMHG | HEART RATE: 89 BPM | BODY MASS INDEX: 27.09 KG/M2 | DIASTOLIC BLOOD PRESSURE: 70 MMHG

## 2022-03-18 LAB
GLUCOSE BLDC GLUCOMTR-MCNC: 164
HBA1C MFR BLD HPLC: 7.8
LDLC SERPL DIRECT ASSAY-MCNC: 118
MICROALBUMIN/CREAT 24H UR-RTO: <2

## 2022-03-18 PROCEDURE — 82962 GLUCOSE BLOOD TEST: CPT

## 2022-03-18 PROCEDURE — 99214 OFFICE O/P EST MOD 30 MIN: CPT | Mod: 25

## 2022-03-18 PROCEDURE — 95251 CONT GLUC MNTR ANALYSIS I&R: CPT

## 2022-03-18 NOTE — PHYSICAL EXAM
[Alert] : alert [EOMI] : extra ocular movement intact [No Accessory Muscle Use] : no accessory muscle use [Clear to Auscultation] : lungs were clear to auscultation bilaterally [Normal S1, S2] : normal S1 and S2 [Normal Rate] : heart rate was normal [No Edema] : no peripheral edema [Not Tender] : non-tender [Soft] : abdomen soft [Oriented x3] : oriented to person, place, and time [Normal Affect] : the affect was normal [Normal Insight/Judgement] : insight and judgment were intact [Normal Mood] : the mood was normal [de-identified] : (+) lipohypertrophy

## 2022-03-18 NOTE — DATA REVIEWED
[FreeTextEntry1] : Thyroid FNA 2006 Right nodule 1.2 cm - no malignant cells\par \par Thyroid sono 5/18/17 - Right thyriod nodule 1.1x1.0x1.0 cm\par \par Thyroid Ultrasound Report 12/20/18 \par Comparison: Report dated 5/18/17. \par Findings: \par Right thyroid lobe   4.0x1.7x1.7  cm\par Right lower pole isoechoic nodule 1.1x1.2x1.0 cm - not vascular\par heterogeneous gland\par Left Thyroid lobe  4.0x1.4x1.1   cm\par heterogeneous gland\par Isthmus .3  cm\par Impression\par stable Right thyroid nodule that was previously biopsied and reported as benign \par \par \par Thyroid Ultrasound Report 12/19/19 \par Comparison: Report dated 12/20/18. \par Findings: \par Right thyroid lobe   4.4x1.5x1.4  cm\par Right lower pole nodule [id number] mm - isoechoic, previously biopsied in 2006 and reported as benign\par Left Thyroid lobe   4.0x1.3x1.5  cm\par Isthmus .3  cm\par Impression\par stable Right thyroid nodule that was previously biopsied and reported as benign\par normal thyroid size\par repeat sono 1 year \par \par Thyroid Ultrasound Report 10/7/21 \par Comparison: Report dated 12/19/19. \par Findings: \par Right thyroid lobe   4.2x1.7x1.5  cm - heterogeneous gland\par Right lower pole nodule 1.1x1.1x1.0 cm - stable, hypoechoic, not vascular, no microcalcification\par Left Thyroid lobe   3.7x1.3x1.2  cm -heterogeneous gland\par Isthmus 0.3  cm\par Impression\par normal thyroid size\par stable Right lower pole thyroid nodule\par repeat sonogram in 1 year \par ================================================\par \par \par Labs 6/25/20\par Gluc 141, A1c 7.6%\par Cr 0.91, GFR 98\par , HDL 56, Tg 92\par direct LDl 119\par urine alb.Cr 0\par TSH 4.710, T4 1.24\par \par \par "Labs 10/23/20 reviewed\par 1.  - too high, watch diet, increase rosuvastatin to 5 mg daily\par 2. A1c 8.1 - too high, send logs\par 3. TSH 4.630 elevated - has he missed any Lt4 doses? is he taking it correctly? if not then increase to LT4 112 mcg daily and repeat TFTs 8 weeks\par 4. lab did wrong urine test, should be urine microalb/Cr - this can be done in 8 weeks with thyroid levels"\par \par Labs 9/20/21\par CBC wnl\par Gluc 120, A1c 8.3\par Cr 0.98, GFR 89\par Tg 100, HDL 64, \par urine alb/Cr 4\par TSH 5.840, Ft4 1.13\par \par Labs 12/6/21 TSH 3.850, Ft4 1.27\par \par Labs 3/10/22\par CBC wnl\par Gluc 196, A1c 7.8\par Cr 1.08, GFR 83\par , HDL 60, Tg 88\par urine alb/Cr neg\par TSH 4.820, Ft4 1.20\par

## 2022-03-18 NOTE — ASSESSMENT
[FreeTextEntry1] : 1. T1DM wo complications - A1c 7.6, improved, recent hyperglycemia due to stress. Yuan reviewed - has better control prior to wifes surgery with increased diligence to diet and and insulin dosing\par - cont Yuan CGM\par - try prandial Lyumjev, sample given\par - cont Lantus 40 units daily\par - cont Novolog: ICR 1:10, ISF 1:30, target 100\par - cont dietary changes\par - rotate insulin injections sides\par - repeat A1c 3 months\par \par 2. Hypothyroid - TSH worse,  increase LT4 137 mcg daily, repeat TFTs 3 months\par \par 3. stable Right thyroid nodule that was previously biopsied in 2006 and reported as benign, \par - repeat sonogram 10/2022 to monitor size and appearance of nodule\par \par 4. hyperlipidemia - LDL chol elevated, adhere with statin, monitor levels

## 2022-03-18 NOTE — REVIEW OF SYSTEMS
[Blurred Vision] : no blurred vision [Chest Pain] : no chest pain [Shortness Of Breath] : no shortness of breath [Nausea] : no nausea [Abdominal Pain] : no abdominal pain [Polyuria] : no polyuria [Nocturia] : no nocturia [Pain/Numbness of Digits] : no pain/numbness of digits [Polydipsia] : no polydipsia [FreeTextEntry4] : sinus infection

## 2022-06-03 ENCOUNTER — APPOINTMENT (OUTPATIENT)
Dept: ENDOCRINOLOGY | Facility: CLINIC | Age: 52
End: 2022-06-03

## 2022-09-13 ENCOUNTER — RX RENEWAL (OUTPATIENT)
Age: 52
End: 2022-09-13

## 2022-09-14 ENCOUNTER — NON-APPOINTMENT (OUTPATIENT)
Age: 52
End: 2022-09-14

## 2022-10-11 LAB
HBA1C MFR BLD HPLC: 7.7
LDLC SERPL DIRECT ASSAY-MCNC: 124

## 2022-10-12 ENCOUNTER — RESULT CHARGE (OUTPATIENT)
Age: 52
End: 2022-10-12

## 2022-10-12 ENCOUNTER — APPOINTMENT (OUTPATIENT)
Dept: ENDOCRINOLOGY | Facility: CLINIC | Age: 52
End: 2022-10-12

## 2022-10-12 VITALS
HEIGHT: 72 IN | DIASTOLIC BLOOD PRESSURE: 68 MMHG | WEIGHT: 207 LBS | OXYGEN SATURATION: 97 % | BODY MASS INDEX: 28.04 KG/M2 | SYSTOLIC BLOOD PRESSURE: 126 MMHG | HEART RATE: 78 BPM

## 2022-10-12 LAB — GLUCOSE BLDC GLUCOMTR-MCNC: 101

## 2022-10-12 PROCEDURE — 99214 OFFICE O/P EST MOD 30 MIN: CPT | Mod: 25

## 2022-10-12 PROCEDURE — 95251 CONT GLUC MNTR ANALYSIS I&R: CPT

## 2022-10-12 PROCEDURE — 82962 GLUCOSE BLOOD TEST: CPT

## 2022-11-03 ENCOUNTER — APPOINTMENT (OUTPATIENT)
Dept: ENDOCRINOLOGY | Facility: CLINIC | Age: 52
End: 2022-11-03

## 2022-11-03 PROCEDURE — 76536 US EXAM OF HEAD AND NECK: CPT

## 2022-11-07 NOTE — ASSESSMENT
[FreeTextEntry1] : 1. T1DM wo complications - A1c 7.7, suboptimcal. Yuan reviewed - erratice control with hyper and hypoglycemia ?due to gastroparesis vs insulin timing\par - cont Yuan CGM, change to Yuan 3 if insurance allows, he does not want dexcom\par - decrease Lantus 38 units daily\par - change Novolog ICR 1:11, SF 1:40 w target 110, needs to bolus 15 min before meals\par - GI eval advised\par - cont dietary changes\par - rotate insulin injections sides\par - repeat A1c next month\par \par 2. Hypothyroid - on LT4 150 mcg for past 4 weeks, repeat labs next month\par \par 3. stable Right thyroid nodule that was previously biopsied in 2006 and reported as benign, \par - repeat sonogram in office to monitor size and appearance of nodule\par \par 4. hyperlipidemia - cont statin\par \par Glucose Sensor Necessity:  This patient with diabetes (dx: E10.65)  This patient is currently using a Yuan continuous glucose monitor.  The patient is treated with insulin via 3 or more injections daily.  This patient requires frequent adjustments to their insulin treatment on the basis of therapeutic continuous glucose monitoring results. (or This patient is adjusting their blood glucose based on data from the continuous glucose monitor.) In addition, the patient has been to our office for an evaluation of their diabetes control within the past 6 months.\par \par Patient is adhering to CGM regimen and diabetes treatment plan.\par

## 2022-11-07 NOTE — PHYSICAL EXAM
[Alert] : alert [EOMI] : extra ocular movement intact [No Accessory Muscle Use] : no accessory muscle use [Clear to Auscultation] : lungs were clear to auscultation bilaterally [Normal S1, S2] : normal S1 and S2 [Normal Rate] : heart rate was normal [No Edema] : no peripheral edema [Not Tender] : non-tender [Soft] : abdomen soft [Oriented x3] : oriented to person, place, and time [Normal Affect] : the affect was normal [Normal Insight/Judgement] : insight and judgment were intact [Normal Mood] : the mood was normal [de-identified] : (+) lipohypertrophy

## 2022-11-07 NOTE — DATA REVIEWED
[FreeTextEntry1] : Thyroid FNA 2006 Right nodule 1.2 cm - no malignant cells\par \par Thyroid sono 5/18/17 - Right thyriod nodule 1.1x1.0x1.0 cm\par \par Thyroid Ultrasound Report 12/20/18 \par Comparison: Report dated 5/18/17. \par Findings: \par Right thyroid lobe   4.0x1.7x1.7  cm\par Right lower pole isoechoic nodule 1.1x1.2x1.0 cm - not vascular\par heterogeneous gland\par Left Thyroid lobe  4.0x1.4x1.1   cm\par heterogeneous gland\par Isthmus .3  cm\par Impression\par stable Right thyroid nodule that was previously biopsied and reported as benign \par \par \par Thyroid Ultrasound Report 12/19/19 \par Comparison: Report dated 12/20/18. \par Findings: \par Right thyroid lobe   4.4x1.5x1.4  cm\par Right lower pole nodule [id number] mm - isoechoic, previously biopsied in 2006 and reported as benign\par Left Thyroid lobe   4.0x1.3x1.5  cm\par Isthmus .3  cm\par Impression\par stable Right thyroid nodule that was previously biopsied and reported as benign\par normal thyroid size\par repeat sono 1 year \par \par Thyroid Ultrasound Report 10/7/21 \par Comparison: Report dated 12/19/19. \par Findings: \par Right thyroid lobe   4.2x1.7x1.5  cm - heterogeneous gland\par Right lower pole nodule 1.1x1.1x1.0 cm - stable, hypoechoic, not vascular, no microcalcification\par Left Thyroid lobe   3.7x1.3x1.2  cm -heterogeneous gland\par Isthmus 0.3  cm\par Impression\par normal thyroid size\par stable Right lower pole thyroid nodule\par repeat sonogram in 1 year \par ================================================\par \par \par Labs 6/25/20\par Gluc 141, A1c 7.6%\par Cr 0.91, GFR 98\par , HDL 56, Tg 92\par direct LDl 119\par urine alb.Cr 0\par TSH 4.710, T4 1.24\par \par \par "Labs 10/23/20 reviewed\par 1.  - too high, watch diet, increase rosuvastatin to 5 mg daily\par 2. A1c 8.1 - too high, send logs\par 3. TSH 4.630 elevated - has he missed any Lt4 doses? is he taking it correctly? if not then increase to LT4 112 mcg daily and repeat TFTs 8 weeks\par 4. lab did wrong urine test, should be urine microalb/Cr - this can be done in 8 weeks with thyroid levels"\par \par Labs 9/20/21\par CBC wnl\par Gluc 120, A1c 8.3\par Cr 0.98, GFR 89\par Tg 100, HDL 64, \par urine alb/Cr 4\par TSH 5.840, Ft4 1.13\par \par Labs 12/6/21 TSH 3.850, Ft4 1.27\par \par Labs 3/10/22\par CBC wnl\par Gluc 196, A1c 7.8\par Cr 1.08, GFR 83\par , HDL 60, Tg 88\par urine alb/Cr neg\par TSH 4.820, Ft4 1.20\par \par Labs 8/18/22\par Gluc 117, A1c 7.7\par Cr 0.94, GFR 98\par , HDL 58, Tg 109\par TSH 4.630, FT4 1.26\par

## 2022-11-07 NOTE — HISTORY OF PRESENT ILLNESS
[FreeTextEntry1] : Interval Hx:  no issues, feels better on higher LT4 dose, tried Lyumjev but felt this did not work well\par \par Quality: Type 1\par Severity: poor control, severe\par Duration: 2006\par Onset: blood test \par \par ASSOCIATED SYMPTOMS/COMPLICATIONS: \par 10/12/21 eye exam no DR\par denies neuropathy.\par 10/2021 neg alb/Cr, GFR 89\par \par MODIFYING FACTORS:\par Lifetsyle: better with diet\par Current DM meds: \par Lantus 40 units in am\par Novolog: ICR 1:12, ISF 1:30, target 100 \par \par SMBG - Yuan\par CGM downloaded and reviewed: Yuan 14 day\par Average glucose: 175\par % time CGM active:  38\par Glucose variability (target <36%): 38\par \par % VERY HIGH (>250): 14\par % HIGH (181-250): 25\par % TARGET (): 50\par % LOW (54-69): 5\par % VERY LOW (<54): 6\par \par Interpretation: erratic control, bouts of hyperglycemia and lows overnight\par \par \par \par  ---------------------------------------------------------------------------------------\par Thyroid nodule since 2006, benign Right nodule FNA in 2006. \par Denies anterior neck pain, dysphagia or voice change\par -------------------------------------------------------------------------------------------------\par Hypothyroid since 2006\par adherent with LT4 150 mcg daily on empty stomach\par -----------------------------------------------------------------------------------------------\par HLD -  adherent Rosuvastatin 5 mg daily\par \par \par \par \par \par

## 2022-11-16 ENCOUNTER — TRANSCRIPTION ENCOUNTER (OUTPATIENT)
Age: 52
End: 2022-11-16

## 2023-01-01 NOTE — HISTORY OF PRESENT ILLNESS
[FreeTextEntry1] : Quality: Type 1\par Severity: poor control, severe\par Duration: 2006\par Onset: blood test \par \par ASSOCIATED SYMPTOMS/COMPLICATIONS: 2019 per pt no DR. edward neuropathy.\par \par MODIFYING FACTORS: \par Diet: eats 3 meals daily, BF high is carb, trying to reduce snacking, sometimes snack at bedtime\par Exercise: moderately\par Current DM meds: Lantus 43 units in am\par  Novolog Carb ratio 1:15, SF: 20, target 100\par \par SMBG - Yuan personal\par CGM downloaded and reviewed: \par \par Average glucose: 177\par SD: 79\par \par % HIGH: 46\par % TARGET:50\par % LOW:4\par \par Interpretation: overnight highs, occasional post prandial lows\par ---------------------------------------------------------------------------------------\par Thyroid nodule since 2006, benign Right nodule FNA in 2006\par -------------------------------------------------------------------------------------------------\par Hypothyroid since 2006\par adherent with LT4 88 mcg daily on empty stomach\par -----------------------------------------------------------------------------------------------\par HLD nonadherent with Rosuvastatin 5 mg every other day\par \par \par \par \par \par  9

## 2023-03-08 ENCOUNTER — RESULT CHARGE (OUTPATIENT)
Age: 53
End: 2023-03-08

## 2023-03-08 ENCOUNTER — APPOINTMENT (OUTPATIENT)
Dept: ENDOCRINOLOGY | Facility: CLINIC | Age: 53
End: 2023-03-08
Payer: COMMERCIAL

## 2023-03-08 VITALS
BODY MASS INDEX: 27.9 KG/M2 | HEART RATE: 78 BPM | WEIGHT: 206 LBS | HEIGHT: 72 IN | SYSTOLIC BLOOD PRESSURE: 132 MMHG | DIASTOLIC BLOOD PRESSURE: 76 MMHG | OXYGEN SATURATION: 98 %

## 2023-03-08 LAB — GLUCOSE BLDC GLUCOMTR-MCNC: 141

## 2023-03-08 PROCEDURE — 82962 GLUCOSE BLOOD TEST: CPT

## 2023-03-08 PROCEDURE — 95251 CONT GLUC MNTR ANALYSIS I&R: CPT

## 2023-03-08 PROCEDURE — 99215 OFFICE O/P EST HI 40 MIN: CPT | Mod: 25

## 2023-03-08 NOTE — PHYSICAL EXAM
[Alert] : alert [EOMI] : extra ocular movement intact [No Accessory Muscle Use] : no accessory muscle use [Clear to Auscultation] : lungs were clear to auscultation bilaterally [Normal S1, S2] : normal S1 and S2 [Normal Rate] : heart rate was normal [No Edema] : no peripheral edema [Not Tender] : non-tender [Soft] : abdomen soft [Oriented x3] : oriented to person, place, and time [Normal Affect] : the affect was normal [Normal Insight/Judgement] : insight and judgment were intact [Normal Mood] : the mood was normal [de-identified] : (+) lipohypertrophy

## 2023-03-08 NOTE — HISTORY OF PRESENT ILLNESS
[FreeTextEntry1] : Interval Hx:  labs done this am, has yuan 3 at home and will start it when current Yuan 14 day sensors run out \par feels prandial insulin works better after meals, rather that before meals\par "digestive system crazy" - constipation alternating with diarrhea\par mentally calculating bolus insulin for meals, has been more that what is in EMR, more like ICR 1:5. estimating for correction\par \par Quality: Type 1\par Severity: poor control, severe\par Duration: 2006\par Onset: blood test \par \par ASSOCIATED SYMPTOMS/COMPLICATIONS: \par 10/12/21 eye exam no DR\par denies neuropathy.\par 10/2021 neg alb/Cr\par \par MODIFYING FACTORS:\par Lifetsyle: better with diet\par Current DM meds: \par Lantus 40 units in am\par Novolog:  as above\par \par SMBG - Yuan\par CGM downloaded and reviewed: Yuan 14 day\par Average glucose: 126\par % time CGM active:  91\par Glucose variability (target <36%): 37\par \par % VERY HIGH (>250): 8\par % HIGH (181-250): 38\par % TARGET (): 45\par % LOW (54-69): 4\par % VERY LOW (<54): 5\par \par Interpretation: erratice control,post meal hyperglycemia, overnight lows, occ post prandial hyperglycemia\par \par ---------------------------------------------------------------------------------------\par Thyroid nodule since 2006, benign Right nodule FNA in 2006. \par Denies anterior neck pain, dysphagia or voice change\par -------------------------------------------------------------------------------------------------\par Hypothyroid since 2006\par adherent with LT4 150 mcg daily on empty stomach\par -----------------------------------------------------------------------------------------------\par HLD -  adherent Rosuvastatin 5 mg daily\par \par \par \par \par \par

## 2023-03-08 NOTE — ASSESSMENT
[FreeTextEntry1] : 1. T1DM wo complications - Yuan reviewed - erratic control,post meal hyperglycemia, overnight lows, occ post prandial hyperglycemia, discussed insulin doses - fearful of lows and fearul of giving himself too much insulin, has been using a more aggressive correction than what we have been discussing in past, also feels bloating and altered bowel habits\par - change to Yuan 3 CGM\par - recommend inpen, to help w bolus calculations and monitoring\par - decrease Lantus 32 units daily\par - change Novolog ICR 1:4, change ISF 1:30 w target 100\par - GI eval advised\par - rotate insulin injections sides\par - repeat A1c done this am, results pending\par \par 2. Hypothyroid - on LT4 150 mcg for past 4 weeks, labs pending\par \par 3. stable Right thyroid nodule that was previously biopsied in 2006 and reported as benign\par - repeat sonogram in 2 years in office to monitor size and appearance of nodule\par \par 4. hyperlipidemia - cont statin\par \par Glucose Sensor Necessity:  This patient with diabetes (dx: E10.65)  This patient is currently using a Yuan continuous glucose monitor.  The patient is treated with insulin via 3 or more injections daily.  This patient requires frequent adjustments to their insulin treatment on the basis of therapeutic continuous glucose monitoring results. (or This patient is adjusting their blood glucose based on data from the continuous glucose monitor.) In addition, the patient has been to our office for an evaluation of their diabetes control within the past 6 months.\par \par Patient is adhering to CGM regimen and diabetes treatment plan.\par

## 2023-03-08 NOTE — DATA REVIEWED
[FreeTextEntry1] : Thyroid FNA 2006 Right nodule 1.2 cm - no malignant cells\par \par Thyroid sono 5/18/17 - Right thyriod nodule 1.1x1.0x1.0 cm\par \par Thyroid Ultrasound Report 12/20/18 \par Comparison: Report dated 5/18/17. \par Findings: \par Right thyroid lobe   4.0x1.7x1.7  cm\par Right lower pole isoechoic nodule 1.1x1.2x1.0 cm - not vascular\par heterogeneous gland\par Left Thyroid lobe  4.0x1.4x1.1   cm\par heterogeneous gland\par Isthmus .3  cm\par Impression\par stable Right thyroid nodule that was previously biopsied and reported as benign \par \par \par Thyroid Ultrasound Report 12/19/19 \par Comparison: Report dated 12/20/18. \par Findings: \par Right thyroid lobe   4.4x1.5x1.4  cm\par Right lower pole nodule [id number] mm - isoechoic, previously biopsied in 2006 and reported as benign\par Left Thyroid lobe   4.0x1.3x1.5  cm\par Isthmus .3  cm\par Impression\par stable Right thyroid nodule that was previously biopsied and reported as benign\par normal thyroid size\par repeat sono 1 year \par \par Thyroid Ultrasound Report 10/7/21 \par Comparison: Report dated 12/19/19. \par Findings: \par Right thyroid lobe   4.2x1.7x1.5  cm - heterogeneous gland\par Right lower pole nodule 1.1x1.1x1.0 cm - stable, hypoechoic, not vascular, no microcalcification\par Left Thyroid lobe   3.7x1.3x1.2  cm -heterogeneous gland\par Isthmus 0.3  cm\par Impression\par normal thyroid size\par stable Right lower pole thyroid nodule\par repeat sonogram in 1 year \par \par Thyroid Ultrasound Report 11/3/22 \par Comparison: Report dated 10/7/21. \par Findings: \par Right thyroid lobe  4.6x1.8x1.4   cm - heterogeneous\par Right lower pole nodule [id number] mm - hypoechoic area - nodule vs patchy area of heterogeneous gland\par Left Thyroid lobe   3.4x1.6x1.1  cm - heterogeneous\par Isthmus 0.3  cm\par Impression:\par normal thyroid size\par heterogeneous gland\par Right hypoechoic are, stable, appears like heterogenous gland rather than nodule\par repeat sono 2 years \par ================================================\par \par \par Labs 6/25/20\par Gluc 141, A1c 7.6%\par Cr 0.91, GFR 98\par , HDL 56, Tg 92\par direct LDl 119\par urine alb.Cr 0\par TSH 4.710, T4 1.24\par \par \par "Labs 10/23/20 reviewed\par 1.  - too high, watch diet, increase rosuvastatin to 5 mg daily\par 2. A1c 8.1 - too high, send logs\par 3. TSH 4.630 elevated - has he missed any Lt4 doses? is he taking it correctly? if not then increase to LT4 112 mcg daily and repeat TFTs 8 weeks\par 4. lab did wrong urine test, should be urine microalb/Cr - this can be done in 8 weeks with thyroid levels"\par \par Labs 9/20/21\par CBC wnl\par Gluc 120, A1c 8.3\par Cr 0.98, GFR 89\par Tg 100, HDL 64, \par urine alb/Cr 4\par TSH 5.840, Ft4 1.13\par \par Labs 12/6/21 TSH 3.850, Ft4 1.27\par \par Labs 3/10/22\par CBC wnl\par Gluc 196, A1c 7.8\par Cr 1.08, GFR 83\par , HDL 60, Tg 88\par urine alb/Cr neg\par TSH 4.820, Ft4 1.20\par \par Labs 8/18/22\par Gluc 117, A1c 7.7\par Cr 0.94, GFR 98\par , HDL 58, Tg 109\par TSH 4.630, FT4 1.26\par

## 2023-04-07 ENCOUNTER — NON-APPOINTMENT (OUTPATIENT)
Age: 53
End: 2023-04-07

## 2023-04-20 ENCOUNTER — NON-APPOINTMENT (OUTPATIENT)
Age: 53
End: 2023-04-20

## 2023-05-15 ENCOUNTER — RX RENEWAL (OUTPATIENT)
Age: 53
End: 2023-05-15

## 2023-06-21 LAB
HBA1C MFR BLD HPLC: 7.7
LDLC SERPL DIRECT ASSAY-MCNC: 131
MICROALBUMIN/CREAT 24H UR-RTO: 4
TSH SERPL-ACNC: 2.7

## 2023-06-22 ENCOUNTER — RESULT CHARGE (OUTPATIENT)
Age: 53
End: 2023-06-22

## 2023-06-22 ENCOUNTER — APPOINTMENT (OUTPATIENT)
Dept: ENDOCRINOLOGY | Facility: CLINIC | Age: 53
End: 2023-06-22
Payer: COMMERCIAL

## 2023-06-22 VITALS
HEIGHT: 72 IN | SYSTOLIC BLOOD PRESSURE: 136 MMHG | DIASTOLIC BLOOD PRESSURE: 82 MMHG | OXYGEN SATURATION: 99 % | BODY MASS INDEX: 27.77 KG/M2 | HEART RATE: 93 BPM | WEIGHT: 205 LBS

## 2023-06-22 LAB — GLUCOSE BLDC GLUCOMTR-MCNC: 140

## 2023-06-22 PROCEDURE — 99215 OFFICE O/P EST HI 40 MIN: CPT | Mod: 25

## 2023-06-22 PROCEDURE — 95251 CONT GLUC MNTR ANALYSIS I&R: CPT

## 2023-06-22 PROCEDURE — 82962 GLUCOSE BLOOD TEST: CPT

## 2023-06-22 RX ORDER — INSULIN ASPART 100 [IU]/ML
100 INJECTION, SOLUTION INTRAVENOUS; SUBCUTANEOUS
Qty: 3 | Refills: 1 | Status: DISCONTINUED | COMMUNITY
Start: 2023-03-09 | End: 2023-06-22

## 2023-06-22 RX ORDER — ROSUVASTATIN CALCIUM 5 MG/1
5 TABLET, FILM COATED ORAL
Qty: 90 | Refills: 1 | Status: DISCONTINUED | COMMUNITY
Start: 2018-11-14 | End: 2023-06-22

## 2023-06-22 RX ORDER — INSULIN PEN,REUSABLE,BT LISPRO
INSULIN PEN (EA) SUBCUTANEOUS
Qty: 1 | Refills: 0 | Status: DISCONTINUED | COMMUNITY
Start: 2023-03-09 | End: 2023-06-22

## 2023-06-22 NOTE — REVIEW OF SYSTEMS
[Recent Weight Gain (___ Lbs)] : no recent weight gain [Recent Weight Loss (___ Lbs)] : no recent weight loss [Blurred Vision] : no blurred vision [Chest Pain] : no chest pain [Shortness Of Breath] : no shortness of breath [Nausea] : no nausea [Abdominal Pain] : no abdominal pain [Polyuria] : no polyuria [Nocturia] : no nocturia [Pain/Numbness of Digits] : no pain/numbness of digits [Polydipsia] : no polydipsia [FreeTextEntry4] : sinus infection

## 2023-06-22 NOTE — HISTORY OF PRESENT ILLNESS
[FreeTextEntry1] : Interval Hx: tried using inpen and 's and went back to old way of insulin dosing\par \par Quality: Type 1\par Severity: poor control, severe\par Duration: 2006\par Onset: blood test \par \par ASSOCIATED SYMPTOMS/COMPLICATIONS: \par 10/12/21 eye exam no \par denies neuropathy.\par 10/2021 neg alb/Cr\par \par MODIFYING FACTORS:\par Lifetsyle: better with diet\par Current DM meds: \par Lantus 40 units in am\par Novolog 35-45 units daily, no exact ratio, also gives correction in between meals 6-8 units, takes insulin right after eating\par \par SMBG - Yuan\par CGM downloaded and reviewed: Yuan 3\par Average glucose: 181\par % time CGM active:  90\par Glucose variability (target <36%): 34\par \par % VERY HIGH (>250): 16\par % HIGH (181-250): 30\par % TARGET (): 52\par % LOW (54-69): 2\par % VERY LOW (<54): 0\par \par Interpretation: erratic control, with hyper and hypoglycemia at times\par ---------------------------------------------------------------------------------------\par Thyroid nodule since 2006, benign Right nodule FNA in 2006. \par Denies anterior neck pain, dysphagia or voice change\par -------------------------------------------------------------------------------------------------\par Hypothyroid since 2006\par adherent with LT4 150 mcg daily on empty stomach\par -----------------------------------------------------------------------------------------------\par HLD - stopped  Rosuvastatin 5 mg daily\par \par \par \par \par \par

## 2023-06-22 NOTE — ASSESSMENT
[FreeTextEntry1] : 1. T1DM wo complications - Yuan reviewed - erratic control,post meal hyperglycemia and also with some evening lows (worse after exercise), A1c 7.7% - improved but suboptimal\par - inpen did not work well for him and he does not want ICR/SF for meal boluses\par - he has been doing insulin after meals which is likely contributing to pp hyperglycemia and higher A1c, suggest split insulin dosing prior to meals 50% bolus at start of meal and 50% insulin during meal\par - small snack prior to exercise\par - cont current Lantus and Novolog insulin doses\par - fearful of low which is contributing to hyperglycemia and poor glycemic control\par - cont Yuan 3 CGM\par - rotate insulin injections sides\par - monitor A1c\par - discussed insulin pump therapy but not ready at this time\par \par 2. Hypothyroid -euthyroid  on LT4 150 mcg daily\par \par 3. stable Right thyroid nodule that was previously biopsied in 2006 and reported as benign\par - repeat sonogram in 2 years in office to monitor size and appearance of nodule\par \par 4. hyperlipidemia - statin intolerance and does not want to take meds, risks of hyperlipidemia discussed, cardio eval advised\par \par Glucose Sensor Necessity:  This patient with diabetes (dx: E10.65)  This patient is currently using a Yuan continuous glucose monitor.  The patient is treated with insulin via 3 or more injections daily.  This patient requires frequent adjustments to their insulin treatment on the basis of therapeutic continuous glucose monitoring results. (or This patient is adjusting their blood glucose based on data from the continuous glucose monitor.) In addition, the patient has been to our office for an evaluation of their diabetes control within the past 6 months.\par \par Patient is adhering to CGM regimen and diabetes treatment plan.\par

## 2023-06-22 NOTE — DATA REVIEWED
[FreeTextEntry1] : Thyroid FNA 2006 Right nodule 1.2 cm - no malignant cells\par \par Thyroid sono 5/18/17 - Right thyriod nodule 1.1x1.0x1.0 cm\par \par Thyroid Ultrasound Report 12/20/18 \par Comparison: Report dated 5/18/17. \par Findings: \par Right thyroid lobe   4.0x1.7x1.7  cm\par Right lower pole isoechoic nodule 1.1x1.2x1.0 cm - not vascular\par heterogeneous gland\par Left Thyroid lobe  4.0x1.4x1.1   cm\par heterogeneous gland\par Isthmus .3  cm\par Impression\par stable Right thyroid nodule that was previously biopsied and reported as benign \par \par \par Thyroid Ultrasound Report 12/19/19 \par Comparison: Report dated 12/20/18. \par Findings: \par Right thyroid lobe   4.4x1.5x1.4  cm\par Right lower pole nodule [id number] mm - isoechoic, previously biopsied in 2006 and reported as benign\par Left Thyroid lobe   4.0x1.3x1.5  cm\par Isthmus .3  cm\par Impression\par stable Right thyroid nodule that was previously biopsied and reported as benign\par normal thyroid size\par repeat sono 1 year \par \par Thyroid Ultrasound Report 10/7/21 \par Comparison: Report dated 12/19/19. \par Findings: \par Right thyroid lobe   4.2x1.7x1.5  cm - heterogeneous gland\par Right lower pole nodule 1.1x1.1x1.0 cm - stable, hypoechoic, not vascular, no microcalcification\par Left Thyroid lobe   3.7x1.3x1.2  cm -heterogeneous gland\par Isthmus 0.3  cm\par Impression\par normal thyroid size\par stable Right lower pole thyroid nodule\par repeat sonogram in 1 year \par \par Thyroid Ultrasound Report 11/3/22 \par Comparison: Report dated 10/7/21. \par Findings: \par Right thyroid lobe  4.6x1.8x1.4   cm - heterogeneous\par Right lower pole nodule [id number] mm - hypoechoic area - nodule vs patchy area of heterogeneous gland\par Left Thyroid lobe   3.4x1.6x1.1  cm - heterogeneous\par Isthmus 0.3  cm\par Impression:\par normal thyroid size\par heterogeneous gland\par Right hypoechoic are, stable, appears like heterogenous gland rather than nodule\par repeat sono 2 years \par ================================================\par \par \par Labs 6/25/20\par Gluc 141, A1c 7.6%\par Cr 0.91, GFR 98\par , HDL 56, Tg 92\par direct LDl 119\par urine alb.Cr 0\par TSH 4.710, T4 1.24\par \par \par "Labs 10/23/20 reviewed\par 1.  - too high, watch diet, increase rosuvastatin to 5 mg daily\par 2. A1c 8.1 - too high, send logs\par 3. TSH 4.630 elevated - has he missed any Lt4 doses? is he taking it correctly? if not then increase to LT4 112 mcg daily and repeat TFTs 8 weeks\par 4. lab did wrong urine test, should be urine microalb/Cr - this can be done in 8 weeks with thyroid levels"\par \par Labs 9/20/21\par CBC wnl\par Gluc 120, A1c 8.3\par Cr 0.98, GFR 89\par Tg 100, HDL 64, \par urine alb/Cr 4\par TSH 5.840, Ft4 1.13\par \par Labs 12/6/21 TSH 3.850, Ft4 1.27\par \par Labs 3/10/22\par CBC wnl\par Gluc 196, A1c 7.8\par Cr 1.08, GFR 83\par , HDL 60, Tg 88\par urine alb/Cr neg\par TSH 4.820, Ft4 1.20\par \par Labs 8/18/22\par Gluc 117, A1c 7.7\par Cr 0.94, GFR 98\par , HDL 58, Tg 109\par TSH 4.630, FT4 1.26\par \par Labs 6/19/23\par Gluc 146, A1c 7.7\par \par TSH 2.700, FT4 1.26\par urine alb/cr 4\par

## 2023-06-22 NOTE — PHYSICAL EXAM
[Alert] : alert [EOMI] : extra ocular movement intact [No Accessory Muscle Use] : no accessory muscle use [Clear to Auscultation] : lungs were clear to auscultation bilaterally [Normal S1, S2] : normal S1 and S2 [Normal Rate] : heart rate was normal [No Edema] : no peripheral edema [Not Tender] : non-tender [Soft] : abdomen soft [Oriented x3] : oriented to person, place, and time [Normal Affect] : the affect was normal [Normal Insight/Judgement] : insight and judgment were intact [Normal Mood] : the mood was normal [de-identified] : (+) lipohypertrophy

## 2023-07-12 RX ORDER — PEN NEEDLE, DIABETIC 29 G X1/2"
31G X 5 MM NEEDLE, DISPOSABLE MISCELLANEOUS
Qty: 4 | Refills: 1 | Status: ACTIVE | COMMUNITY
Start: 2019-06-28 | End: 1900-01-01

## 2023-10-23 LAB
HBA1C MFR BLD HPLC: 7.4
LDLC SERPL DIRECT ASSAY-MCNC: 122
TSH SERPL-ACNC: 3.08

## 2023-10-26 ENCOUNTER — NON-APPOINTMENT (OUTPATIENT)
Age: 53
End: 2023-10-26

## 2023-12-06 ENCOUNTER — APPOINTMENT (OUTPATIENT)
Dept: ENDOCRINOLOGY | Facility: CLINIC | Age: 53
End: 2023-12-06
Payer: COMMERCIAL

## 2023-12-06 VITALS
SYSTOLIC BLOOD PRESSURE: 126 MMHG | OXYGEN SATURATION: 99 % | HEART RATE: 90 BPM | DIASTOLIC BLOOD PRESSURE: 74 MMHG | HEIGHT: 72 IN | WEIGHT: 206 LBS | BODY MASS INDEX: 27.9 KG/M2

## 2023-12-06 LAB — GLUCOSE BLDC GLUCOMTR-MCNC: 182

## 2023-12-06 PROCEDURE — 82962 GLUCOSE BLOOD TEST: CPT

## 2023-12-06 PROCEDURE — 99215 OFFICE O/P EST HI 40 MIN: CPT | Mod: 25

## 2023-12-06 PROCEDURE — 95251 CONT GLUC MNTR ANALYSIS I&R: CPT

## 2023-12-06 RX ORDER — ROSUVASTATIN CALCIUM 5 MG/1
5 TABLET, FILM COATED ORAL
Qty: 90 | Refills: 1 | Status: ACTIVE | COMMUNITY
Start: 2023-12-06 | End: 1900-01-01

## 2024-05-24 LAB
HBA1C MFR BLD HPLC: 8
LDLC SERPL DIRECT ASSAY-MCNC: 136
MICROALBUMIN/CREAT 24H UR-RTO: 5
TSH SERPL-ACNC: 3.24

## 2024-05-28 ENCOUNTER — APPOINTMENT (OUTPATIENT)
Dept: ENDOCRINOLOGY | Facility: CLINIC | Age: 54
End: 2024-05-28
Payer: COMMERCIAL

## 2024-05-28 VITALS
SYSTOLIC BLOOD PRESSURE: 124 MMHG | DIASTOLIC BLOOD PRESSURE: 80 MMHG | HEIGHT: 72 IN | WEIGHT: 201 LBS | HEART RATE: 74 BPM | BODY MASS INDEX: 27.22 KG/M2 | OXYGEN SATURATION: 99 %

## 2024-05-28 DIAGNOSIS — E78.5 HYPERLIPIDEMIA, UNSPECIFIED: ICD-10-CM

## 2024-05-28 DIAGNOSIS — E06.3 AUTOIMMUNE THYROIDITIS: ICD-10-CM

## 2024-05-28 DIAGNOSIS — E04.1 NONTOXIC SINGLE THYROID NODULE: ICD-10-CM

## 2024-05-28 DIAGNOSIS — E10.65 TYPE 1 DIABETES MELLITUS WITH HYPERGLYCEMIA: ICD-10-CM

## 2024-05-28 DIAGNOSIS — Z79.4 LONG TERM (CURRENT) USE OF INSULIN: ICD-10-CM

## 2024-05-28 LAB — GLUCOSE BLDC GLUCOMTR-MCNC: 86

## 2024-05-28 PROCEDURE — 99215 OFFICE O/P EST HI 40 MIN: CPT

## 2024-05-28 PROCEDURE — 95251 CONT GLUC MNTR ANALYSIS I&R: CPT

## 2024-05-28 PROCEDURE — 82962 GLUCOSE BLOOD TEST: CPT

## 2024-05-28 RX ORDER — INSULIN ASPART 100 [IU]/ML
100 INJECTION, SOLUTION INTRAVENOUS; SUBCUTANEOUS
Qty: 3 | Refills: 1 | Status: ACTIVE | COMMUNITY
Start: 2019-06-07 | End: 1900-01-01

## 2024-05-28 RX ORDER — LEVOTHYROXINE SODIUM 0.15 MG/1
150 TABLET ORAL DAILY
Qty: 90 | Refills: 2 | Status: ACTIVE | COMMUNITY
Start: 2020-04-03 | End: 1900-01-01

## 2024-05-28 RX ORDER — INSULIN GLARGINE 100 [IU]/ML
100 INJECTION, SOLUTION SUBCUTANEOUS
Qty: 3 | Refills: 1 | Status: ACTIVE | COMMUNITY
Start: 2023-05-15 | End: 1900-01-01

## 2024-05-28 NOTE — ASSESSMENT
[FreeTextEntry1] : 1. T1DM wo complications - Yuan reviewed - worsening control, daytime/post prandial hyperglycemia, A1c worsening and up to 8%. Hyperglycemia multifactoral due to sinus infx, nonadherence with prandial insulin dosing and timing - inpen did not work well for him and he does not want ICR/SF for meal boluses - he does not want VGO insulin delivery system - cont current Novolog dosing but take before meals!!! - cont Lantus 40 units daily - fearful of low which is contributing to hyperglycemia and poor glycemic control, discussed glycemic targets - cont Yuan 3 CGM - rotate insulin injections sides - monitor A1c - discussed insulin pump therapy but not ready at this time - GI eval for gastroparesis  2. Hypothyroid -euthyroid on LT4 150 mcg daily  3. stable Right thyroid nodule that was previously biopsied in 2006 and reported as benign - repeat sonogram due to monitor size and appearance of nodule  4. hyperlipidemia - resume rosuvastatin, long term vascualr risks of HLD discussed  Glucose Sensor Necessity: This patient with diabetes (dx: E10.65) This patient is currently using a Yuan continuous glucose monitor. The patient is treated with insulin via 3 or more injections daily. This patient requires frequent adjustments to their insulin treatment on the basis of therapeutic continuous glucose monitoring results. (or This patient is adjusting their blood glucose based on data from the continuous glucose monitor.) In addition, the patient has been to our office for an evaluation of their diabetes control within the past 6 months. Patient is adhering to CGM regimen and diabetes treatment plan.  40 min spent discussion of Yuan finding, discussion of insulin dosing and gastroparesis

## 2024-05-28 NOTE — PHYSICAL EXAM
[Alert] : alert [EOMI] : extra ocular movement intact [No Accessory Muscle Use] : no accessory muscle use [Clear to Auscultation] : lungs were clear to auscultation bilaterally [Normal S1, S2] : normal S1 and S2 [Normal Rate] : heart rate was normal [No Edema] : no peripheral edema [Not Tender] : non-tender [Soft] : abdomen soft [Oriented x3] : oriented to person, place, and time [Normal Affect] : the affect was normal [Normal Insight/Judgement] : insight and judgment were intact [Normal Mood] : the mood was normal

## 2024-05-28 NOTE — DATA REVIEWED
[FreeTextEntry1] : Thyroid FNA 2006 Right nodule 1.2 cm - no malignant cells  Thyroid sono 5/18/17 - Right thyriod nodule 1.1x1.0x1.0 cm  Thyroid Ultrasound Report 12/20/18  Comparison: Report dated 5/18/17.  Findings:  Right thyroid lobe   4.0x1.7x1.7  cm Right lower pole isoechoic nodule 1.1x1.2x1.0 cm - not vascular heterogeneous gland Left Thyroid lobe  4.0x1.4x1.1   cm heterogeneous gland Isthmus .3  cm Impression stable Right thyroid nodule that was previously biopsied and reported as benign    Thyroid Ultrasound Report 12/19/19  Comparison: Report dated 12/20/18.  Findings:  Right thyroid lobe   4.4x1.5x1.4  cm Right lower pole nodule [id number] mm - isoechoic, previously biopsied in 2006 and reported as benign Left Thyroid lobe   4.0x1.3x1.5  cm Isthmus .3  cm Impression stable Right thyroid nodule that was previously biopsied and reported as benign normal thyroid size repeat sono 1 year   Thyroid Ultrasound Report 10/7/21  Comparison: Report dated 12/19/19.  Findings:  Right thyroid lobe   4.2x1.7x1.5  cm - heterogeneous gland Right lower pole nodule 1.1x1.1x1.0 cm - stable, hypoechoic, not vascular, no microcalcification Left Thyroid lobe   3.7x1.3x1.2  cm -heterogeneous gland Isthmus 0.3  cm Impression normal thyroid size stable Right lower pole thyroid nodule repeat sonogram in 1 year   Thyroid Ultrasound Report 11/3/22  Comparison: Report dated 10/7/21.  Findings:  Right thyroid lobe  4.6x1.8x1.4   cm - heterogeneous Right lower pole nodule [id number] mm - hypoechoic area - nodule vs patchy area of heterogeneous gland Left Thyroid lobe   3.4x1.6x1.1  cm - heterogeneous Isthmus 0.3  cm Impression: normal thyroid size heterogeneous gland Right hypoechoic are, stable, appears like heterogenous gland rather than nodule repeat sono 2 years  ================================================   Labs 6/25/20 Gluc 141, A1c 7.6% Cr 0.91, GFR 98 , HDL 56, Tg 92 direct LDl 119 urine alb.Cr 0 TSH 4.710, T4 1.24   "Labs 10/23/20 reviewed 1.  - too high, watch diet, increase rosuvastatin to 5 mg daily 2. A1c 8.1 - too high, send logs 3. TSH 4.630 elevated - has he missed any Lt4 doses? is he taking it correctly? if not then increase to LT4 112 mcg daily and repeat TFTs 8 weeks 4. lab did wrong urine test, should be urine microalb/Cr - this can be done in 8 weeks with thyroid levels"  Labs 9/20/21 CBC wnl Gluc 120, A1c 8.3 Cr 0.98, GFR 89 Tg 100, HDL 64,  urine alb/Cr 4 TSH 5.840, Ft4 1.13  Labs 12/6/21 TSH 3.850, Ft4 1.27  Labs 3/10/22 CBC wnl Gluc 196, A1c 7.8 Cr 1.08, GFR 83 , HDL 60, Tg 88 urine alb/Cr neg TSH 4.820, Ft4 1.20  Labs 8/18/22 Gluc 117, A1c 7.7 Cr 0.94, GFR 98 , HDL 58, Tg 109 TSH 4.630, FT4 1.26  Labs 6/19/23 Gluc 146, A1c 7.7  TSH 2.700, FT4 1.26 urine alb/cr 4  Labs 10/16/23 Gluc 89, a1C 7.4 Cr 0.98, GFR 91 , HDL 59, Trig 90 tsh 3.080, ft4 1.36  Labs 5/22/24 TSH 3.240, FT4 1.56 Gluc 106, A1c 8% Cr 0.90,  , HDL 62, Trig 92 urine alb/cr 5

## 2024-05-28 NOTE — HISTORY OF PRESENT ILLNESS
[FreeTextEntry1] : Interval Hx: worsening sinus in past 2 weeks, has been on/off steroids; was also away on vacation  Quality: Type 1 Severity: poor control, severe Duration: 2006 Onset: blood test   ASSOCIATED SYMPTOMS/COMPLICATIONS:  10/2023 eye exam no  denies neuropathy. 10/2021 neg alb/Cr  MODIFYING FACTORS: Lifetsyle: nonadherent with diet Current DM meds:  Lantus 40 units in am Novolog 8-16 units premeals. does not carb count (50-90 grams carbs/meal), taking insulin before or after meals. sometimes misses prandial insulin ex. 50 grams: take 8-12 units and given more/less insulin depending on meal. Has bedtime snack - variable amounts of insulin  SMBG - Yuan CGM downloaded and reviewed: Occipital 3 Average glucose: 183 % time CGM active:  97 Glucose variability (target <36%): 41  % VERY HIGH (>250): 20 % HIGH (181-250): 29 % TARGET (): 49 % LOW (54-69): 2 % VERY LOW (<54): 0  Interpretation: worsening control, daytime/post prandial hyperglycemia --------------------------------------------------------------------------------------- Thyroid nodule since 2006, benign Right nodule FNA in 2006.  Denies anterior neck pain, dysphagia or voice change ------------------------------------------------------------------------------------------------- Hypothyroid since 2006 adherent with LT4 150 mcg daily on empty stomach ----------------------------------------------------------------------------------------------- HLD - non-adherent with Rosuvastatin 5 mg daily

## 2024-08-30 LAB
HBA1C MFR BLD HPLC: 7.3
LDLC SERPL DIRECT ASSAY-MCNC: 124
TSH SERPL-ACNC: 5.26

## 2024-09-03 ENCOUNTER — APPOINTMENT (OUTPATIENT)
Dept: ENDOCRINOLOGY | Facility: CLINIC | Age: 54
End: 2024-09-03
Payer: COMMERCIAL

## 2024-09-03 VITALS
WEIGHT: 200 LBS | OXYGEN SATURATION: 97 % | DIASTOLIC BLOOD PRESSURE: 68 MMHG | SYSTOLIC BLOOD PRESSURE: 122 MMHG | HEART RATE: 52 BPM | HEIGHT: 72 IN | BODY MASS INDEX: 27.09 KG/M2

## 2024-09-03 DIAGNOSIS — E10.65 TYPE 1 DIABETES MELLITUS WITH HYPERGLYCEMIA: ICD-10-CM

## 2024-09-03 DIAGNOSIS — E04.2 NONTOXIC MULTINODULAR GOITER: ICD-10-CM

## 2024-09-03 DIAGNOSIS — E06.3 AUTOIMMUNE THYROIDITIS: ICD-10-CM

## 2024-09-03 DIAGNOSIS — Z79.4 LONG TERM (CURRENT) USE OF INSULIN: ICD-10-CM

## 2024-09-03 DIAGNOSIS — E78.5 HYPERLIPIDEMIA, UNSPECIFIED: ICD-10-CM

## 2024-09-03 DIAGNOSIS — E04.1 NONTOXIC SINGLE THYROID NODULE: ICD-10-CM

## 2024-09-03 LAB — GLUCOSE BLDC GLUCOMTR-MCNC: 194

## 2024-09-03 PROCEDURE — 99215 OFFICE O/P EST HI 40 MIN: CPT

## 2024-09-03 PROCEDURE — 82962 GLUCOSE BLOOD TEST: CPT

## 2024-09-03 NOTE — ASSESSMENT
[FreeTextEntry1] : 1. T1DM wo complications - umnable to DL Yuan having issues with iPHone maldonado and sensor, reports good control in past motnh with  hypoglycemia on weekends during intense outdoor activity - he will call [facility] and troubleshoot issue with them - take 1/2 dose insulin on days with planned acitivity or have light snack without insulin before physical activity - inpen did not work well for him and he does not want ICR/SF for meal boluses - he does not want VGO insulin delivery system - cont current Novolog dosing but take before meals!!! - cont Lantus 40 units daily - fearful of low which is contributing to hyperglycemia and poor glycemic control, discussed glycemic targets - cont Yuan 3 CGM - rotate insulin injections sides - monitor A1c - discussed insulin pump therapy but not ready at this time  2. Hypothyroid - TSH mildly elevated on labs but he has been on Lt4 150 mcg daily for long time, repeats labs in 1 month and if TSH still elevated will increase LT4 doseing  3. stable Right thyroid nodule that was previously biopsied in 2006 and reported as benign, recent sonogram with increase size of nodule and TR4 nodule - repeat thyroid FNA biopsy needed  4. hyperlipidemia - resume rosuvastatin!!, long term vascualr risks of HLD discussed, try co q10 to help with side effects  Glucose Sensor Necessity: This patient with diabetes (dx: E10.65) This patient is currently using a Yuan continuous glucose monitor. The patient is treated with insulin via 3 or more injections daily. This patient requires frequent adjustments to their insulin treatment on the basis of therapeutic continuous glucose monitoring results. (or This patient is adjusting their blood glucose based on data from the continuous glucose monitor.) In addition, the patient has been to our office for an evaluation of their diabetes control within the past 6 months. Patient is adhering to CGM regimen and diabetes treatment plan.  40 min spent discussion of Yuan finding, discussion of insulin dosing, discussion of thyroid nodule FNA, discussion of HLD

## 2024-09-03 NOTE — DATA REVIEWED
[FreeTextEntry1] : Thyroid FNA 2006 Right nodule 1.2 cm - no malignant cells  Thyroid sono 5/18/17 - Right thyriod nodule 1.1x1.0x1.0 cm  Thyroid Ultrasound Report 12/20/18  Comparison: Report dated 5/18/17.  Findings:  Right thyroid lobe   4.0x1.7x1.7  cm Right lower pole isoechoic nodule 1.1x1.2x1.0 cm - not vascular heterogeneous gland Left Thyroid lobe  4.0x1.4x1.1   cm heterogeneous gland Isthmus .3  cm Impression stable Right thyroid nodule that was previously biopsied and reported as benign    Thyroid Ultrasound Report 12/19/19  Comparison: Report dated 12/20/18.  Findings:  Right thyroid lobe   4.4x1.5x1.4  cm Right lower pole nodule [id number] mm - isoechoic, previously biopsied in 2006 and reported as benign Left Thyroid lobe   4.0x1.3x1.5  cm Isthmus .3  cm Impression stable Right thyroid nodule that was previously biopsied and reported as benign normal thyroid size repeat sono 1 year   Thyroid Ultrasound Report 10/7/21  Comparison: Report dated 12/19/19.  Findings:  Right thyroid lobe   4.2x1.7x1.5  cm - heterogeneous gland Right lower pole nodule 1.1x1.1x1.0 cm - stable, hypoechoic, not vascular, no microcalcification Left Thyroid lobe   3.7x1.3x1.2  cm -heterogeneous gland Isthmus 0.3  cm Impression normal thyroid size stable Right lower pole thyroid nodule repeat sonogram in 1 year   Thyroid Ultrasound Report 11/3/22  Comparison: Report dated 10/7/21.  Findings:  Right thyroid lobe  4.6x1.8x1.4   cm - heterogeneous Right lower pole nodule [id number] mm - hypoechoic area - nodule vs patchy area of heterogeneous gland Left Thyroid lobe   3.4x1.6x1.1  cm - heterogeneous Isthmus 0.3  cm Impression: normal thyroid size heterogeneous gland Right hypoechoic are, stable, appears like heterogenous gland rather than nodule repeat sono 2 years   Thyroid sono 8/26/24 RLP nodule [id number] mm - TR4, increased size RUP nodule 6x4x4 mm - TR3 LLP nodule 4x5x3 mm - TR3 reactive level 2 lymph nodes ================================================   Labs 6/25/20 Gluc 141, A1c 7.6% Cr 0.91, GFR 98 , HDL 56, Tg 92 direct LDl 119 urine alb.Cr 0 TSH 4.710, T4 1.24   "Labs 10/23/20 reviewed 1.  - too high, watch diet, increase rosuvastatin to 5 mg daily 2. A1c 8.1 - too high, send logs 3. TSH 4.630 elevated - has he missed any Lt4 doses? is he taking it correctly? if not then increase to LT4 112 mcg daily and repeat TFTs 8 weeks 4. lab did wrong urine test, should be urine microalb/Cr - this can be done in 8 weeks with thyroid levels"  Labs 9/20/21 CBC wnl Gluc 120, A1c 8.3 Cr 0.98, GFR 89 Tg 100, HDL 64,  urine alb/Cr 4 TSH 5.840, Ft4 1.13  Labs 12/6/21 TSH 3.850, Ft4 1.27  Labs 3/10/22 CBC wnl Gluc 196, A1c 7.8 Cr 1.08, GFR 83 , HDL 60, Tg 88 urine alb/Cr neg TSH 4.820, Ft4 1.20  Labs 8/18/22 Gluc 117, A1c 7.7 Cr 0.94, GFR 98 , HDL 58, Tg 109 TSH 4.630, FT4 1.26  Labs 6/19/23 Gluc 146, A1c 7.7  TSH 2.700, FT4 1.26 urine alb/cr 4  Labs 10/16/23 Gluc 89, a1C 7.4 Cr 0.98, GFR 91 , HDL 59, Trig 90 tsh 3.080, ft4 1.36  Labs 5/22/24 TSH 3.240, FT4 1.56 Gluc 106, A1c 8% Cr 0.90,  , HDL 62, Trig 92 urine alb/cr 5  Labs 8/27/24 Gluc 117, A1c 7.3 Cr 1.00 GFR 89 , HDL 59, Trig 82 TSH 5.260 Ft4 1.39

## 2024-09-03 NOTE — HISTORY OF PRESENT ILLNESS
[FreeTextEntry1] : Interval Hx: having trouble with Yuan CGM, forgot password and could not log in, no recent Yuan DL lsat report in [place] from 7/22/24-8/5/24, reports improved control in past month ; s/p head cold 1 month ago  Quality: Type 1 Severity: poor control, severe Duration: 2006 Onset: blood test   ASSOCIATED SYMPTOMS/COMPLICATIONS:  10/2023 eye exam no  denies neuropathy. 10/2021 neg alb/Cr  MODIFYING FACTORS: Lifetsyle: nonadherent with diet Current DM meds:  Lantus 40 units in am Novolog 8-16 units premeals. does not carb count (50-90 grams carbs/meal), taking insulin before or after meals. sometimes misses prandial insulin ex. 50 grams: take 8-12 units and given more/less insulin depending on meal. Has bedtime snack - variable amounts of insulin  Thyroid nodule since 2006, benign Right nodule FNA in 2006.  Denies anterior neck pain, dysphagia or voice change ------------------------------------------------------------------------------------------------- Hypothyroid since 2006 adherent with LT4 150 mcg daily on empty stomach ----------------------------------------------------------------------------------------------- HLD - non-adherent with Rosuvastatin 5 mg daily

## 2024-09-03 NOTE — PHYSICAL EXAM
[Alert] : alert [EOMI] : extra ocular movement intact [Clear to Auscultation] : lungs were clear to auscultation bilaterally [Normal S1, S2] : normal S1 and S2 [Normal Rate] : heart rate was normal [No Edema] : no peripheral edema [Not Tender] : non-tender [Soft] : abdomen soft [Oriented x3] : oriented to person, place, and time [Normal Affect] : the affect was normal [Normal Insight/Judgement] : insight and judgment were intact [Normal Mood] : the mood was normal [Normal Rate and Effort] : normal respiratory rate and effort [Acanthosis Nigricans] : no acanthosis nigricans [Foot Ulcers] : no foot ulcers [2+] : 2+ in the dorsalis pedis [Vibration Dec.] : normal vibratory sensation at the level of the toes [Diminished Throughout Both Feet] : normal tactile sensation with monofilament testing throughout both feet

## 2025-01-09 LAB
HBA1C MFR BLD HPLC: 7.6
LDLC SERPL DIRECT ASSAY-MCNC: 118

## 2025-01-10 ENCOUNTER — APPOINTMENT (OUTPATIENT)
Dept: ENDOCRINOLOGY | Facility: CLINIC | Age: 55
End: 2025-01-10
Payer: COMMERCIAL

## 2025-01-10 ENCOUNTER — NON-APPOINTMENT (OUTPATIENT)
Age: 55
End: 2025-01-10

## 2025-01-10 VITALS
HEIGHT: 72 IN | OXYGEN SATURATION: 97 % | BODY MASS INDEX: 26.68 KG/M2 | SYSTOLIC BLOOD PRESSURE: 100 MMHG | WEIGHT: 197 LBS | HEART RATE: 77 BPM | DIASTOLIC BLOOD PRESSURE: 60 MMHG

## 2025-01-10 DIAGNOSIS — E78.5 HYPERLIPIDEMIA, UNSPECIFIED: ICD-10-CM

## 2025-01-10 DIAGNOSIS — E06.3 AUTOIMMUNE THYROIDITIS: ICD-10-CM

## 2025-01-10 DIAGNOSIS — Z79.4 LONG TERM (CURRENT) USE OF INSULIN: ICD-10-CM

## 2025-01-10 DIAGNOSIS — E04.2 NONTOXIC MULTINODULAR GOITER: ICD-10-CM

## 2025-01-10 DIAGNOSIS — E10.65 TYPE 1 DIABETES MELLITUS WITH HYPERGLYCEMIA: ICD-10-CM

## 2025-01-10 LAB — GLUCOSE BLDC GLUCOMTR-MCNC: 155

## 2025-01-10 PROCEDURE — 99215 OFFICE O/P EST HI 40 MIN: CPT

## 2025-01-10 PROCEDURE — 82962 GLUCOSE BLOOD TEST: CPT

## 2025-01-11 ENCOUNTER — TRANSCRIPTION ENCOUNTER (OUTPATIENT)
Age: 55
End: 2025-01-11

## 2025-05-15 ENCOUNTER — APPOINTMENT (OUTPATIENT)
Dept: ENDOCRINOLOGY | Facility: CLINIC | Age: 55
End: 2025-05-15
Payer: COMMERCIAL

## 2025-05-15 VITALS
DIASTOLIC BLOOD PRESSURE: 60 MMHG | HEART RATE: 73 BPM | OXYGEN SATURATION: 98 % | SYSTOLIC BLOOD PRESSURE: 118 MMHG | BODY MASS INDEX: 26.82 KG/M2 | WEIGHT: 198 LBS | HEIGHT: 72 IN

## 2025-05-15 DIAGNOSIS — Z79.4 LONG TERM (CURRENT) USE OF INSULIN: ICD-10-CM

## 2025-05-15 DIAGNOSIS — E10.65 TYPE 1 DIABETES MELLITUS WITH HYPERGLYCEMIA: ICD-10-CM

## 2025-05-15 DIAGNOSIS — E78.5 HYPERLIPIDEMIA, UNSPECIFIED: ICD-10-CM

## 2025-05-15 DIAGNOSIS — E06.3 AUTOIMMUNE THYROIDITIS: ICD-10-CM

## 2025-05-15 DIAGNOSIS — E04.2 NONTOXIC MULTINODULAR GOITER: ICD-10-CM

## 2025-05-15 LAB — GLUCOSE BLDC GLUCOMTR-MCNC: 150

## 2025-05-15 PROCEDURE — 82962 GLUCOSE BLOOD TEST: CPT

## 2025-05-15 PROCEDURE — 99215 OFFICE O/P EST HI 40 MIN: CPT

## 2025-05-15 PROCEDURE — 95251 CONT GLUC MNTR ANALYSIS I&R: CPT
